# Patient Record
Sex: MALE | Race: ASIAN | NOT HISPANIC OR LATINO | Employment: FULL TIME | URBAN - METROPOLITAN AREA
[De-identification: names, ages, dates, MRNs, and addresses within clinical notes are randomized per-mention and may not be internally consistent; named-entity substitution may affect disease eponyms.]

---

## 2017-03-14 ENCOUNTER — OFFICE VISIT (OUTPATIENT)
Dept: URGENT CARE | Facility: URGENT CARE | Age: 27
End: 2017-03-14
Payer: COMMERCIAL

## 2017-03-14 VITALS
SYSTOLIC BLOOD PRESSURE: 149 MMHG | WEIGHT: 186.8 LBS | BODY MASS INDEX: 26.8 KG/M2 | TEMPERATURE: 98.1 F | DIASTOLIC BLOOD PRESSURE: 85 MMHG | OXYGEN SATURATION: 98 % | HEART RATE: 114 BPM

## 2017-03-14 DIAGNOSIS — B00.1 COLD SORE: Primary | ICD-10-CM

## 2017-03-14 DIAGNOSIS — B00.1 HERPES LABIALIS: ICD-10-CM

## 2017-03-14 PROCEDURE — 99213 OFFICE O/P EST LOW 20 MIN: CPT | Performed by: FAMILY MEDICINE

## 2017-03-14 RX ORDER — ACYCLOVIR 400 MG/1
400 TABLET ORAL 3 TIMES DAILY
Qty: 15 TABLET | Refills: 5 | Status: SHIPPED | OUTPATIENT
Start: 2017-03-14 | End: 2018-05-09

## 2017-03-14 NOTE — NURSING NOTE
"Chief Complaint   Patient presents with     Mouth Lesions     inside of mouth x 1 week and outside for 2 days       Initial /85  Pulse 114  Temp 98.1  F (36.7  C) (Oral)  Wt 186 lb 12.8 oz (84.7 kg)  SpO2 98%  BMI 26.8 kg/m2 Estimated body mass index is 26.8 kg/(m^2) as calculated from the following:    Height as of 7/25/14: 5' 10\" (1.778 m).    Weight as of this encounter: 186 lb 12.8 oz (84.7 kg).  BP completed using cuff size: annetta BURTON CMA ([facility])  4:55 PM 3/14/2017    "

## 2017-03-14 NOTE — PROGRESS NOTES
SUBJECTIVE:                                                    Piedad Medina is a 26 year old male who presents to clinic today for the following health issues:      Canker sores x 2 for 1 week and cold sore x 2 days.     Canker sore x 1 week, getting worse x 3 days.  Larger and more painful.  Tried OTC without relief.    Cold sore developed 1 day ago.  Gets prodrome of itching/tingling.    No assoc URI.    Admits to increase stress.    Had never had Rx for cold sores.      Problem list and histories reviewed & adjusted, as indicated.  Additional history: as documented    Patient Active Problem List   Diagnosis     CARDIOVASCULAR SCREENING; LDL GOAL LESS THAN 160     Past Surgical History   Procedure Laterality Date     Dental surgery       wisdom       Social History   Substance Use Topics     Smoking status: Former Smoker     Smokeless tobacco: Never Used      Comment: does have E-cig     Alcohol use Yes     Family History   Problem Relation Age of Onset     DIABETES Maternal Grandmother      HEART DISEASE Maternal Grandfather      smoking     Cardiovascular Paternal Grandfather      COPD         Current Outpatient Prescriptions   Medication Sig Dispense Refill     acyclovir (ZOVIRAX) 400 MG tablet Take 1 tablet (400 mg) by mouth 3 times daily 15 tablet 5     No Known Allergies    ROS:  Constitutional, HEENT, cardiovascular, pulmonary, gi and gu systems are negative, except as otherwise noted.    OBJECTIVE:                                                    /85  Pulse 114  Temp 98.1  F (36.7  C) (Oral)  Wt 186 lb 12.8 oz (84.7 kg)  SpO2 98%  BMI 26.8 kg/m2  Body mass index is 26.8 kg/(m^2).   GENERAL: healthy, alert and no distress  HENT: ear canals and TM's normal, nose and mouth with one 1cm ulcer on left buccal mucosa. right angle of mouth with classic vesicular eruption.  NECK: bilateral anterior cervical adenopathy, no asymmetry, masses, or scars and thyroid normal to palpation  RESP: lungs clear to  auscultation - no rales, rhonchi or wheezes  CV: regular rate and rhythm, normal S1 S2, no S3 or S4, no murmur, click or rub, no peripheral edema and peripheral pulses strong  ABDOMEN: soft, nontender, no hepatosplenomegaly, no masses and bowel sounds normal  MS: no gross musculoskeletal defects noted, no edema    Diagnostic Test Results:  none      ASSESSMENT:                                                    Canker sore/aphthous ulcer  Herpes labialis     PLAN:                                                        ICD-10-CM    1. Cold sore B00.1 acyclovir (ZOVIRAX) 400 MG tablet         See orders for meds.    Also try OTC cankaid and ibu for pain.  Follow up in 4 days if not improving.      Guy Abdi MD  [facility]

## 2017-03-14 NOTE — MR AVS SNAPSHOT
After Visit Summary   3/14/2017    Piedad Medina    MRN: [id number]           Patient Information     Date Of Birth          1990        Visit Information        Provider Department      3/14/2017 5:05 PM Guy Abdi MD [facility]        Today's Diagnoses     Cold sore    -  1    Herpes labialis           Follow-ups after your visit        Who to contact     If you have questions or need follow up information about today's clinic visit or your schedule please contact [facility] directly at [phone].  Normal or non-critical lab and imaging results will be communicated to you by MarketSharehart, letter or phone within 4 business days after the clinic has received the results. If you do not hear from us within 7 days, please contact the clinic through 8thBridget or phone. If you have a critical or abnormal lab result, we will notify you by phone as soon as possible.  Submit refill requests through [email or web address] or call your pharmacy and they will forward the refill request to us. Please allow 3 business days for your refill to be completed.          Additional Information About Your Visit        MyChart Information     [email or web address] gives you secure access to your electronic health record. If you see a primary care provider, you can also send messages to your care team and make appointments. If you have questions, please call your primary care clinic.  If you do not have a primary care provider, please call [phone] and they will assist you.        Care EveryWhere ID     This is your Care EveryWhere ID. This could be used by other organizations to access your [place] medical records  [id number]        Your Vitals Were     Pulse Temperature Pulse Oximetry BMI (Body Mass Index)          114 98.1  F (36.7  C) (Oral) 98% 26.8 kg/m2         Blood Pressure from Last 3 Encounters:   03/14/17 149/85   07/25/14 141/88   02/20/14 128/80    Weight from Last 3 Encounters:   03/14/17 186  lb 12.8 oz (84.7 kg)   07/25/14 168 lb (76.2 kg)   02/20/14 164 lb (74.4 kg)              Today, you had the following     No orders found for display         Today's Medication Changes          These changes are accurate as of: 3/14/17  5:23 PM.  If you have any questions, ask your nurse or doctor.               Start taking these medicines.        Dose/Directions    acyclovir 400 MG tablet   Commonly known as:  ZOVIRAX   Used for:  Cold sore   Started by:  Guy Abdi MD        Dose:  400 mg   Take 1 tablet (400 mg) by mouth 3 times daily   Quantity:  15 tablet   Refills:  5            Where to get your medicines      These medications were sent to [id number]  [place] - [address] [place] AT [facility] [address] [place] [address]     Phone:  [phone]     acyclovir 400 MG tablet                Primary Care Provider    Referred MD Joel       No address on file        Thank you!     Thank you for choosing [facility]  for your care. Our goal is always to provide you with excellent care. Hearing back from our patients is one way we can continue to improve our services. Please take a few minutes to complete the written survey that you may receive in the mail after your visit with us. Thank you!             Your Updated Medication List - Protect others around you: Learn how to safely use, store and throw away your medicines at www.disposemymeds.org.          This list is accurate as of: 3/14/17  5:23 PM.  Always use your most recent med list.                   Brand Name Dispense Instructions for use    acyclovir 400 MG tablet    ZOVIRAX    15 tablet    Take 1 tablet (400 mg) by mouth 3 times daily

## 2017-04-10 ENCOUNTER — MYC REFILL (OUTPATIENT)
Dept: URGENT CARE | Facility: URGENT CARE | Age: 27
End: 2017-04-10

## 2017-04-10 DIAGNOSIS — B00.1 COLD SORE: ICD-10-CM

## 2017-04-10 RX ORDER — ACYCLOVIR 400 MG/1
400 TABLET ORAL 3 TIMES DAILY
Qty: 15 TABLET | Refills: 5 | Status: CANCELLED | OUTPATIENT
Start: 2017-04-10

## 2017-04-10 NOTE — TELEPHONE ENCOUNTER
Message from MyChart:  Original authorizing provider: Guy Abdi MD    Piedad Medina would like a refill of the following medications:  acyclovir (ZOVIRAX) 400 MG tablet [Guy Abdi MD]    Preferred pharmacy: [facility] [address] [place] [address] [address] AT [facility] [address]    Comment:  I can feel a cold sore coming back. Had the flu and weakened my immune system.

## 2018-04-30 ENCOUNTER — MYC REFILL (OUTPATIENT)
Dept: URGENT CARE | Facility: URGENT CARE | Age: 28
End: 2018-04-30

## 2018-04-30 DIAGNOSIS — B00.1 COLD SORE: ICD-10-CM

## 2018-04-30 RX ORDER — ACYCLOVIR 400 MG/1
400 TABLET ORAL 3 TIMES DAILY
Qty: 15 TABLET | Refills: 5 | Status: CANCELLED | OUTPATIENT
Start: 2018-04-30

## 2018-05-01 NOTE — TELEPHONE ENCOUNTER
Message from StarMobilehart:  Original authorizing provider: Guy Abdi MD    Piedad Medina would like a refill of the following medications:  acyclovir (ZOVIRAX) 400 MG tablet [Guy Abdi MD]    Preferred pharmacy: [facility] [address] [place] [address] [address] AT [facility] [address]    Comment:  I need this medication for my cold sore. [facility] has already tried contacting [facility] but no response yet.

## 2018-05-09 DIAGNOSIS — B00.1 COLD SORE: ICD-10-CM

## 2018-05-09 RX ORDER — ACYCLOVIR 400 MG/1
400 TABLET ORAL 3 TIMES DAILY
Qty: 15 TABLET | Refills: 0 | Status: SHIPPED | OUTPATIENT
Start: 2018-05-09 | End: 2018-11-06

## 2018-05-09 NOTE — LETTER
May 9, 2018    Piedad Medina  [address]   [place] [address]    Dear Piedad,       We recently received a refill request for acyclovir (ZOVIRAX) 400 MG tablet.  We have refilled this for a one time supply only because you are due for a:    Physical office visit and Fasting lab appointment      Please schedule this lab appointment 4-5 days prior to the office visit.     Please call at your earliest convenience so that there will not be a delay with your future refills.          Thank you,   Your [facility] Team/ks  [phone]

## 2018-05-09 NOTE — TELEPHONE ENCOUNTER
Medication is being filled for 1 time refill only due to:  Patient needs to be seen for fasting lab appointment and appointment with the provider for further refills.  Dulce Swartz RN

## 2018-07-18 ENCOUNTER — TELEPHONE (OUTPATIENT)
Dept: FAMILY MEDICINE | Facility: CLINIC | Age: 28
End: 2018-07-18

## 2018-07-18 NOTE — LETTER
[facility]  [address] [facility] [id number] [phone]    July 18, 2018    Piedad Medina  [address]   [place] [facility] [address]      Dear Piedad Medina:    Your health is important to us and we missed you at your recent appointment. Please call us if you need to reschedule your appointment for another date and time.   Any time you are unable to keep your appointment we kindly ask that you call us at least two hours in advance to let us know. This will allow us to offer the time to another patient.  If you did not attend your appointment because of concerns over your ability to pay for care, please contact me so we can discuss payment options.   If you have any questions regarding this notice, please contact me at  [phone].   Sincerely,           [facility] Clinic Administrator/Manager

## 2018-07-18 NOTE — TELEPHONE ENCOUNTER
Per Isaak Thomas's request-no show letter # 1 done for no shows on 7/18/18 and 7/10/18.Susan Dyer MA/SABINE

## 2018-09-12 ENCOUNTER — MYC MEDICAL ADVICE (OUTPATIENT)
Dept: FAMILY MEDICINE | Facility: CLINIC | Age: 28
End: 2018-09-12

## 2018-09-14 NOTE — TELEPHONE ENCOUNTER
LM X 2 09/12/18 and 09/14/18, needs to reschedule appointment. Also sent My Chart message 09/12/18.  No response from patient.  SABINE Hayes

## 2018-09-17 NOTE — TELEPHONE ENCOUNTER
XAVI x3 and sent My Chart message with no response. This appointment has been cancelled. They will need to reschedule.  SABINE Hayes

## 2018-11-06 ENCOUNTER — OFFICE VISIT (OUTPATIENT)
Dept: FAMILY MEDICINE | Facility: CLINIC | Age: 28
End: 2018-11-06
Payer: COMMERCIAL

## 2018-11-06 VITALS
HEART RATE: 75 BPM | TEMPERATURE: 97.5 F | OXYGEN SATURATION: 100 % | WEIGHT: 184.38 LBS | HEIGHT: 69 IN | DIASTOLIC BLOOD PRESSURE: 81 MMHG | BODY MASS INDEX: 27.31 KG/M2 | SYSTOLIC BLOOD PRESSURE: 126 MMHG | RESPIRATION RATE: 16 BRPM

## 2018-11-06 DIAGNOSIS — Z00.00 PREVENTATIVE HEALTH CARE: Primary | ICD-10-CM

## 2018-11-06 DIAGNOSIS — Z23 NEED FOR VACCINATION: ICD-10-CM

## 2018-11-06 DIAGNOSIS — L81.8 TATTOO: ICD-10-CM

## 2018-11-06 DIAGNOSIS — B00.1 COLD SORE: ICD-10-CM

## 2018-11-06 LAB
HBV CORE AB SERPL QL IA: NONREACTIVE
HBV SURFACE AB SERPL IA-ACNC: 18.31 M[IU]/ML
HBV SURFACE AG SERPL QL IA: NONREACTIVE
HCV AB SERPL QL IA: NONREACTIVE
HIV 1+2 AB+HIV1 P24 AG SERPL QL IA: NONREACTIVE

## 2018-11-06 PROCEDURE — 99395 PREV VISIT EST AGE 18-39: CPT | Mod: 25 | Performed by: INTERNAL MEDICINE

## 2018-11-06 PROCEDURE — 86706 HEP B SURFACE ANTIBODY: CPT | Performed by: INTERNAL MEDICINE

## 2018-11-06 PROCEDURE — 90471 IMMUNIZATION ADMIN: CPT | Performed by: INTERNAL MEDICINE

## 2018-11-06 PROCEDURE — 87340 HEPATITIS B SURFACE AG IA: CPT | Performed by: INTERNAL MEDICINE

## 2018-11-06 PROCEDURE — 86704 HEP B CORE ANTIBODY TOTAL: CPT | Performed by: INTERNAL MEDICINE

## 2018-11-06 PROCEDURE — 36415 COLL VENOUS BLD VENIPUNCTURE: CPT | Performed by: INTERNAL MEDICINE

## 2018-11-06 PROCEDURE — 86803 HEPATITIS C AB TEST: CPT | Performed by: INTERNAL MEDICINE

## 2018-11-06 PROCEDURE — 87389 HIV-1 AG W/HIV-1&-2 AB AG IA: CPT | Performed by: INTERNAL MEDICINE

## 2018-11-06 PROCEDURE — 90632 HEPA VACCINE ADULT IM: CPT | Performed by: INTERNAL MEDICINE

## 2018-11-06 RX ORDER — ACYCLOVIR 400 MG/1
400 TABLET ORAL 3 TIMES DAILY
Qty: 15 TABLET | Refills: 3 | Status: SHIPPED | OUTPATIENT
Start: 2018-11-06 | End: 2019-10-22

## 2018-11-06 ASSESSMENT — ENCOUNTER SYMPTOMS
HEADACHES: 1
DIARRHEA: 0
JOINT SWELLING: 0
WEAKNESS: 0
HEMATOCHEZIA: 0
NAUSEA: 0
HEARTBURN: 0
EYE PAIN: 0
CHILLS: 0
MYALGIAS: 0
CONSTIPATION: 0
DYSURIA: 0
NERVOUS/ANXIOUS: 0
SORE THROAT: 0
PARESTHESIAS: 0
PALPITATIONS: 0
COUGH: 0
ARTHRALGIAS: 0
SHORTNESS OF BREATH: 0
FREQUENCY: 0
HEMATURIA: 0
DIZZINESS: 0
FEVER: 0
ABDOMINAL PAIN: 0

## 2018-11-06 ASSESSMENT — PAIN SCALES - GENERAL: PAINLEVEL: NO PAIN (0)

## 2018-11-06 NOTE — MR AVS SNAPSHOT
After Visit Summary   11/6/2018    Piedad Medina    MRN: [id number]           Patient Information     Date Of Birth          1990        Visit Information        Provider Department      11/6/2018 8:00 AM Rafaela Oden MD [facility]        Today's Diagnoses     Preventative health care    -  1    Cold sore        Tattoo        Need for vaccination          Care Instructions      Preventive Health Recommendations  Male Ages 26 - 39    Yearly exam:             See your health care provider every year in order to  o   Review health changes.   o   Discuss preventive care.    o   Review your medicines if your doctor has prescribed any.    You should be tested each year for STDs (sexually transmitted diseases), if you re at risk.     After age 35, talk to your provider about cholesterol testing. If you are at risk for heart disease, have your cholesterol tested at least every 5 years.     If you are at risk for diabetes, you should have a diabetes test (fasting glucose).  Shots: Get a flu shot each year. Get a tetanus shot every 10 years.     Nutrition:    Eat at least 5 servings of fruits and vegetables daily.     Eat whole-grain bread, whole-wheat pasta and brown rice instead of white grains and rice.     Get adequate Calcium and Vitamin D.     Lifestyle    Exercise for at least 150 minutes a week (30 minutes a day, 5 days a week). This will help you control your weight and prevent disease.     Limit alcohol to one drink per day.     No smoking.     Wear sunscreen to prevent skin cancer.     See your dentist every six months for an exam and cleaning.             Follow-ups after your visit        Who to contact     If you have questions or need follow up information about today's clinic visit or your schedule please contact [facility] directly at [phone].  Normal or non-critical lab and imaging results will be communicated to you by MyChart, letter or phone within 4  "business days after the clinic has received the results. If you do not hear from us within 7 days, please contact the clinic through Rapid RMS or phone. If you have a critical or abnormal lab result, we will notify you by phone as soon as possible.  Submit refill requests through Rapid RMS or call your pharmacy and they will forward the refill request to us. Please allow 3 business days for your refill to be completed.          Additional Information About Your Visit        Rapid RMS Information     Rapid RMS gives you secure access to your electronic health record. If you see a primary care provider, you can also send messages to your care team and make appointments. If you have questions, please call your primary care clinic.  If you do not have a primary care provider, please call [phone] and they will assist you.        Care EveryWhere ID     This is your Care EveryWhere ID. This could be used by other organizations to access your [place] medical records  [id number]        Your Vitals Were     Pulse Temperature Respirations Height Pulse Oximetry BMI (Body Mass Index)    75 97.5  F (36.4  C) (Oral) 16 5' 9.49\" (1.765 m) 100% 26.85 kg/m2       Blood Pressure from Last 3 Encounters:   11/06/18 126/81   03/14/17 149/85   07/25/14 141/88    Weight from Last 3 Encounters:   11/06/18 184 lb 6 oz (83.6 kg)   03/14/17 186 lb 12.8 oz (84.7 kg)   07/25/14 168 lb (76.2 kg)              We Performed the Following     1st  Administration  [34811]     HEPA VACCINE ADULT IM     Hepatitis B core antibody [EFS7107]     Hepatitis B Surface Antibody [LCW7375]     Hepatitis B surface antigen [ZHV788]     Hepatitis C antibody [TIE882]     HIV Antigen Antibody Combo [BCV6565]     Lipid panel reflex to direct LDL Fasting     SCREENING QUESTIONS FOR ADULT IMMUNIZATIONS          Where to get your medicines      These medications were sent to [facility] [address] [place] [address] [address] AT [facility] [address] [address] [address] [facility] [address]     Phone:  [phone]     acyclovir 400 MG tablet          Primary Care Provider Office Phone # Fax #    [facility] [phone] [address] [facility] [address]        Equal Access to Services     LUZ CACERES : Hadii aad ku hadasho Soomaali, waaxda luqadaha, qaybta kaalmada adeegyada, waxay idiin hayaan adeeg khdarlingsh layina terry. So [facility] [phone].    ATENCIÓN: Si habla español, tiene a baldwin disposición servicios gratuitos de asistencia lingüística. Llame al [phone].    We comply with applicable federal civil rights laws and [place] laws. We do not discriminate on the basis of race, color, national origin, age, disability, sex, sexual orientation, or gender identity.            Thank you!     Thank you for choosing [facility]  for your care. Our goal is always to provide you with excellent care. Hearing back from our patients is one way we can continue to improve our services. Please take a few minutes to complete the written survey that you may receive in the mail after your visit with us. Thank you!             Your Updated Medication List - Protect others around you: Learn how to safely use, store and throw away your medicines at www.disposemymeds.org.          This list is accurate as of 11/6/18  8:34 AM.  Always use your most recent med list.                   Brand Name Dispense Instructions for use Diagnosis    acyclovir 400 MG tablet    ZOVIRAX    15 tablet    Take 1 tablet (400 mg) by mouth 3 times daily    Cold sore

## 2018-11-06 NOTE — LETTER
[facility]  [address] [facility] [id number] [phone]        November 13, 2019    Piedad Medina  [address]   [place] [address]              Dear Piedad Medina    This is to remind you that your Fasting lab is due.    You may call our office at [phone] to schedule an appointment.    Please disregard this notice if you have already had your labs drawn or made an appointment.        Sincerely,        Rafaela Oden MD

## 2018-11-06 NOTE — PROGRESS NOTES
SUBJECTIVE:   CC: Piedad Medina is an 28 year old male who presents for preventative health visit.     Physical   Annual:     Getting at least 3 servings of Calcium per day:  Yes    Bi-annual eye exam:  Yes    Dental care twice a year:  Yes    Sleep apnea or symptoms of sleep apnea:  Excessive snoring    Diet:  Regular (no restrictions)    Frequency of exercise:  2-3 days/week    Duration of exercise:  30-45 minutes    Taking medications regularly:  Yes    Medication side effects:  None    Additional concerns today:  Yes      Cold sores - needs refill of medication.    Today's PHQ-2 Score:   PHQ-2 ( 1999 Pfizer) 11/6/2018   Q1: Little interest or pleasure in doing things 0   Q2: Feeling down, depressed or hopeless 0   PHQ-2 Score 0   Q1: Little interest or pleasure in doing things Not at all   Q2: Feeling down, depressed or hopeless Not at all   PHQ-2 Score 0       Do you feel safe in your environment - Yes    Social History   Substance Use Topics     Smoking status: Former Smoker     Smokeless tobacco: Never Used      Comment: does have E-cig     Alcohol use Yes         Last PSA: No results found for: PSA    Reviewed orders with patient. Reviewed health maintenance and updated orders accordingly - Yes    Reviewed and updated as needed this visit by clinical staff  Tobacco  Allergies  Meds  Med Hx  Surg Hx  Fam Hx  Soc Hx        Reviewed and updated as needed this visit by Provider        History reviewed. No pertinent past medical history.   Past Surgical History:   Procedure Laterality Date     DENTAL SURGERY      wisdom       Review of Systems   Constitutional: Negative for chills and fever.   HENT: Negative for congestion, ear pain, hearing loss and sore throat.    Eyes: Negative for pain and visual disturbance.   Respiratory: Negative for cough and shortness of breath.    Cardiovascular: Negative for chest pain, palpitations and peripheral edema.   Gastrointestinal: Negative for abdominal pain,  "constipation, diarrhea, heartburn, hematochezia and nausea.   Genitourinary: Negative for discharge, dysuria, frequency, genital sores, hematuria, impotence and urgency.   Musculoskeletal: Negative for arthralgias, joint swelling and myalgias.   Skin: Negative for rash.   Neurological: Positive for headaches. Negative for dizziness, weakness and paresthesias.   Psychiatric/Behavioral: Negative for mood changes. The patient is not nervous/anxious.      CONSTITUTIONAL: NEGATIVE for fever, chills, change in weight  INTEGUMENTARY/SKIN: NEGATIVE for worrisome rashes, moles or lesions except for cold sore on lower lip.  EYES: NEGATIVE for vision changes or irritation  ENT: NEGATIVE for ear, mouth and throat problems  RESP: NEGATIVE for significant cough or SOB  CV: NEGATIVE for chest pain, palpitations or peripheral edema  GI: NEGATIVE for nausea, abdominal pain, heartburn, or change in bowel habits   male: negative for dysuria, hematuria, decreased urinary stream, erectile dysfunction, urethral discharge  MUSCULOSKELETAL: NEGATIVE for significant arthralgias or myalgia  NEURO: NEGATIVE for weakness, dizziness or paresthesias  PSYCHIATRIC: NEGATIVE for changes in mood or affect    OBJECTIVE:   /81  Pulse 75  Temp 97.5  F (36.4  C) (Oral)  Resp 16  Ht 5' 9.49\" (1.765 m)  Wt 184 lb 6 oz (83.6 kg)  SpO2 100%  BMI 26.85 kg/m2    Physical Exam  GENERAL: healthy, alert and no distress  EYES: Eyes grossly normal to inspection, PERRL and conjunctivae and sclerae normal  HENT: ear canals and TM's normal, nose and pharynx without ulcers or lesions.  Right lower lip with 5mm slightly raised lesion, c/w healing cold sore.  NECK: no adenopathy, no asymmetry, masses, or scars and thyroid normal to palpation  RESP: lungs clear to auscultation - no rales, rhonchi or wheezes  CV: regular rate and rhythm, normal S1 S2, no S3 or S4, no murmur, click or rub, no peripheral edema and peripheral pulses strong  ABDOMEN: soft, " "nontender, no hepatosplenomegaly, no masses and bowel sounds normal   (male): normal male genitalia without lesions or urethral discharge, no hernia  MS: no gross musculoskeletal defects noted, no edema  SKIN: no suspicious lesions or rashes.  Tattoos present  NEURO: Normal strength and tone, mentation intact and speech normal  PSYCH: mentation appears normal, affect normal/bright    Diagnostic Test Results:  none     ASSESSMENT/PLAN:       ICD-10-CM    1. Preventative health care Z00.00 HEPA VACCINE ADULT IM     HIV Antigen Antibody Combo [USW3763]     Hepatitis B core antibody [LRJ4699]     Hepatitis B Surface Antibody [DCB9282]     Hepatitis B surface antigen [KTK367]     Hepatitis C antibody [ZAX568]     Lipid panel reflex to direct LDL Fasting     CANCELED: Lipid panel reflex to direct LDL Fasting   2. Cold sore B00.1 acyclovir (ZOVIRAX) 400 MG tablet   3. Tattoo L81.8 HIV Antigen Antibody Combo [XMI4432]     Hepatitis B core antibody [EII0389]     Hepatitis B Surface Antibody [BTL4136]     Hepatitis B surface antigen [HLA719]     Hepatitis C antibody [QTZ534]   4. Need for vaccination Z23 1st  Administration  [49719]     SCREENING QUESTIONS FOR ADULT IMMUNIZATIONS       COUNSELING:   Reviewed preventive health counseling, as reflected in patient instructions    BP Readings from Last 1 Encounters:   11/06/18 126/81     Estimated body mass index is 26.85 kg/(m^2) as calculated from the following:    Height as of this encounter: 5' 9.49\" (1.765 m).    Weight as of this encounter: 184 lb 6 oz (83.6 kg).      Weight management plan: Discussed healthy diet and exercise guidelines and patient will follow up in 12 months in clinic to re-evaluate.     reports that he has quit smoking. He has never used smokeless tobacco.    Refill of his acyclovir given for prn use for cold sores as he has used this previously.  Await lab results and manage as indicated.  Hep A dose #2 given today.   Check hiv and hepatitis tests " given no testing since last tattoo done.      Counseling Resources:  ATP IV Guidelines  Pooled Cohorts Equation Calculator  FRAX Risk Assessment  ICSI Preventive Guidelines  Dietary Guidelines for Americans, 2010  USDA's MyPlate  ASA Prophylaxis  Lung CA Screening    Rafaela Oden MD  [facility] [place]  Answers for HPI/ROS submitted by the patient on 11/6/2018   PHQ-2 Score: 0

## 2019-05-25 ENCOUNTER — TRANSFERRED RECORDS (OUTPATIENT)
Dept: HEALTH INFORMATION MANAGEMENT | Facility: CLINIC | Age: 29
End: 2019-05-25

## 2019-10-16 ENCOUNTER — MYC REFILL (OUTPATIENT)
Dept: FAMILY MEDICINE | Facility: CLINIC | Age: 29
End: 2019-10-16

## 2019-10-16 DIAGNOSIS — B00.1 COLD SORE: ICD-10-CM

## 2019-10-16 RX ORDER — ACYCLOVIR 400 MG/1
400 TABLET ORAL 3 TIMES DAILY
Qty: 15 TABLET | Refills: 3 | OUTPATIENT
Start: 2019-10-16

## 2019-10-16 NOTE — TELEPHONE ENCOUNTER
Requested Prescriptions   Pending Prescriptions Disp Refills     acyclovir (ZOVIRAX) 400 MG tablet 15 tablet 3     Sig: Take 1 tablet (400 mg) by mouth 3 times daily       Antivirals for Herpes Protocol Failed - 10/16/2019  9:22 AM        Failed - Normal serum creatinine on file in past 12 months

## 2019-10-19 ENCOUNTER — MYC REFILL (OUTPATIENT)
Dept: FAMILY MEDICINE | Facility: CLINIC | Age: 29
End: 2019-10-19

## 2019-10-19 DIAGNOSIS — B00.1 COLD SORE: ICD-10-CM

## 2019-10-19 RX ORDER — ACYCLOVIR 400 MG/1
400 TABLET ORAL 3 TIMES DAILY
Qty: 15 TABLET | Refills: 3 | Status: CANCELLED | OUTPATIENT
Start: 2019-10-19

## 2019-10-21 NOTE — TELEPHONE ENCOUNTER
TC, please assist patient with appointment and after its made RN can refill medication.    Faith Song BSN, RN

## 2019-10-22 RX ORDER — ACYCLOVIR 400 MG/1
400 TABLET ORAL 3 TIMES DAILY
Qty: 9 TABLET | Refills: 0 | Status: SHIPPED | OUTPATIENT
Start: 2019-10-22 | End: 2020-03-04

## 2019-12-11 ENCOUNTER — DOCUMENTATION ONLY (OUTPATIENT)
Dept: LAB | Facility: CLINIC | Age: 29
End: 2019-12-11

## 2019-12-11 NOTE — PROGRESS NOTES
On 19, we sent an over due lab letter to this patient and he has not made an appt. The tests are now  and have been canceled. If you would like him to return to the clinic for a lab only appt, please have your team contact him and place new Future orders.    Thank you, Sho Luo MLT

## 2020-03-02 ENCOUNTER — HEALTH MAINTENANCE LETTER (OUTPATIENT)
Age: 30
End: 2020-03-02

## 2020-03-04 ENCOUNTER — OFFICE VISIT (OUTPATIENT)
Dept: FAMILY MEDICINE | Facility: CLINIC | Age: 30
End: 2020-03-04
Payer: COMMERCIAL

## 2020-03-04 VITALS
BODY MASS INDEX: 26.48 KG/M2 | WEIGHT: 185 LBS | OXYGEN SATURATION: 99 % | HEART RATE: 66 BPM | TEMPERATURE: 98 F | HEIGHT: 70 IN | SYSTOLIC BLOOD PRESSURE: 111 MMHG | DIASTOLIC BLOOD PRESSURE: 73 MMHG

## 2020-03-04 DIAGNOSIS — B07.8 COMMON WART: ICD-10-CM

## 2020-03-04 DIAGNOSIS — B00.1 COLD SORE: Primary | ICD-10-CM

## 2020-03-04 PROCEDURE — 17110 DESTRUCTION B9 LES UP TO 14: CPT | Performed by: NURSE PRACTITIONER

## 2020-03-04 PROCEDURE — 99213 OFFICE O/P EST LOW 20 MIN: CPT | Mod: 25 | Performed by: NURSE PRACTITIONER

## 2020-03-04 RX ORDER — ACYCLOVIR 400 MG/1
400 TABLET ORAL 3 TIMES DAILY
Qty: 9 TABLET | Refills: 0 | Status: SHIPPED | OUTPATIENT
Start: 2020-03-04 | End: 2020-03-04

## 2020-03-04 RX ORDER — ACYCLOVIR 400 MG/1
400 TABLET ORAL 3 TIMES DAILY
Qty: 21 TABLET | Refills: 3 | Status: SHIPPED | OUTPATIENT
Start: 2020-03-04 | End: 2021-11-04

## 2020-03-04 ASSESSMENT — PAIN SCALES - GENERAL: PAINLEVEL: NO PAIN (0)

## 2020-03-04 ASSESSMENT — MIFFLIN-ST. JEOR: SCORE: 1805.4

## 2020-03-04 NOTE — PROGRESS NOTES
Subjective     Piedad Medina is a 29 year old male who presents to clinic today for the following health issues:    HPI   Medication Followup of Acyclovir    Taking Medication as prescribed: yes    Side Effects:  None    Medication Helping Symptoms:  yes     Gets 7-8 outbreaks per year that are usually triggered by fluctuation in health. He had a upper respiratory infection last week.    He also has a wart on his thumb that he would like treated. He has had it for about 1.5 years. He's tried OTC therapy without relief.     Patient Active Problem List   Diagnosis     CARDIOVASCULAR SCREENING; LDL GOAL LESS THAN 160     Past Surgical History:   Procedure Laterality Date     DENTAL SURGERY      wisdom       Social History     Tobacco Use     Smoking status: Former Smoker     Smokeless tobacco: Never Used     Tobacco comment: does have E-cig   Substance Use Topics     Alcohol use: Yes     Family History   Problem Relation Age of Onset     Diabetes Maternal Grandmother      Heart Disease Maternal Grandfather         smoking     Cardiovascular Paternal Grandfather         COPD         Current Outpatient Medications   Medication Sig Dispense Refill     acyclovir (ZOVIRAX) 400 MG tablet Take 1 tablet (400 mg) by mouth 3 times daily for 7 days This is enough for 3 courses 21 tablet 3     No Known Allergies  BP Readings from Last 3 Encounters:   03/04/20 111/73   11/06/18 126/81   03/14/17 149/85    Wt Readings from Last 3 Encounters:   03/04/20 83.9 kg (185 lb)   11/06/18 83.6 kg (184 lb 6 oz)   03/14/17 84.7 kg (186 lb 12.8 oz)                      Reviewed and updated as needed this visit by Provider  Tobacco  Allergies  Meds  Problems  Med Hx  Surg Hx  Fam Hx         Review of Systems   ROS COMP: Constitutional, HEENT, cardiovascular, pulmonary, gi and gu systems are negative, except as otherwise noted.      Objective    /73 (BP Location: Right arm, Patient Position: Chair, Cuff Size: Adult Large)   Pulse 66   " Temp 98  F (36.7  C) (Oral)   Ht 1.778 m (5' 10\")   Wt 83.9 kg (185 lb)   SpO2 99%   BMI 26.54 kg/m    Body mass index is 26.54 kg/m .  Physical Exam   GENERAL: healthy, alert and no distress  NECK: no adenopathy, no asymmetry, masses, or scars and thyroid normal to palpation  RESP: lungs clear to auscultation - no rales, rhonchi or wheezes  CV: regular rate and rhythm, normal S1 S2, no S3 or S4, no murmur, click or rub, no peripheral edema and peripheral pulses strong  MS: no gross musculoskeletal defects noted, no edema  SKIN: common wart to right thumb; cluster of vesicular lesions over erythematous base to corner of right mouth  PSYCH: mentation appears normal, affect normal/bright    Diagnostic Test Results:  Labs reviewed in Epic        Consent obtained. 3 freeze/thaw cycles to common wart on right thumb. Covered with bacitracin and a band aid. Patient tolerated well without any immediate complications.    Assessment & Plan     1. Cold sore  Refilled.   - acyclovir (ZOVIRAX) 400 MG tablet; Take 1 tablet (400 mg) by mouth 3 times daily for 7 days This is enough for 3 courses  Dispense: 21 tablet; Refill: 3    2. Common wart  Treated with cryotherapy in clinic today. Discussed home cares. RTC 3-4 weeks if not resolved for repeat cryotherapy.       BMI:   Estimated body mass index is 26.54 kg/m  as calculated from the following:    Height as of this encounter: 1.778 m (5' 10\").    Weight as of this encounter: 83.9 kg (185 lb).           See Patient Instructions    Return in about 4 weeks (around 4/1/2020) for if wart is not gone.     The benefits, risks and potential side effects were discussed in detail. Black box warnings discussed as relevant. All patient questions were answered. The patient was instructed to follow up immediately if any adverse reactions develop.    Return precautions discussed, including when to seek urgent/emergent care.    Patient verbalizes understanding and agrees with plan of " care. Patient stable for discharge.      JAIDEN Garcia [facility]

## 2020-03-04 NOTE — PATIENT INSTRUCTIONS
At [facility], we strive to deliver an exceptional experience to you, every time we see you. If you receive a survey, please complete it as we do value your feedback.  If you have MyChart, you can expect to receive results automatically within 24 hours of their completion.  Your provider will send a note interpreting your results as well.   If you do not have MyChart, you should receive your results in about a week by mail.    Your care team:                            Family Medicine Internal Medicine   MD Jose Alexander MD Shantel Branch-Fleming, MD Katya Georgiev PA-C Megan Hill, APRKVNG Bose, MD Pediatrics   Andres Escamilla, PARajendraC  Kitty Medrano, MD Francoise Paredes APRN CNP   MD Jailene To MD Deborah Mielke, MD Kim Thein, APRN CNP      Clinic hours: Monday - Thursday 7 am-7 pm; Fridays 7 am-5 pm.   Urgent care: Monday - Friday 11 am-9 pm; Saturday and Sunday 9 am-5 pm.  Pharmacy : Monday -Thursday 8 am-8 pm; Friday 8 am-6 pm; Saturday and Sunday 9 am-5 pm.     Clinic: [phone]   Pharmacy: [phone]

## 2020-12-20 ENCOUNTER — HEALTH MAINTENANCE LETTER (OUTPATIENT)
Age: 30
End: 2020-12-20

## 2021-04-24 ENCOUNTER — HEALTH MAINTENANCE LETTER (OUTPATIENT)
Age: 31
End: 2021-04-24

## 2021-08-11 ENCOUNTER — IMMUNIZATION (OUTPATIENT)
Dept: NURSING | Facility: CLINIC | Age: 31
End: 2021-08-11
Payer: COMMERCIAL

## 2021-08-11 PROCEDURE — 0001A PR COVID VAC PFIZER DIL RECON 30 MCG/0.3 ML IM: CPT

## 2021-08-11 PROCEDURE — 91300 PR COVID VAC PFIZER DIL RECON 30 MCG/0.3 ML IM: CPT

## 2021-09-01 ENCOUNTER — IMMUNIZATION (OUTPATIENT)
Dept: NURSING | Facility: CLINIC | Age: 31
End: 2021-09-01
Attending: FAMILY MEDICINE
Payer: COMMERCIAL

## 2021-09-01 PROCEDURE — 91300 PR COVID VAC PFIZER DIL RECON 30 MCG/0.3 ML IM: CPT

## 2021-09-01 PROCEDURE — 0002A PR COVID VAC PFIZER DIL RECON 30 MCG/0.3 ML IM: CPT

## 2021-10-03 ENCOUNTER — HEALTH MAINTENANCE LETTER (OUTPATIENT)
Age: 31
End: 2021-10-03

## 2021-11-04 ENCOUNTER — MYC REFILL (OUTPATIENT)
Dept: FAMILY MEDICINE | Facility: CLINIC | Age: 31
End: 2021-11-04

## 2021-11-04 DIAGNOSIS — B00.1 COLD SORE: ICD-10-CM

## 2021-11-04 RX ORDER — ACYCLOVIR 400 MG/1
400 TABLET ORAL 3 TIMES DAILY
Qty: 21 TABLET | Refills: 3 | Status: CANCELLED | OUTPATIENT
Start: 2021-11-04

## 2021-11-08 NOTE — PATIENT INSTRUCTIONS
At [facility], we strive to deliver an exceptional experience to you, every time we see you. If you receive a survey, please complete it as we do value your feedback.  If you have MyChart, you can expect to receive results automatically within 24 hours of their completion.  Your provider will send a note interpreting your results as well.   If you do not have MyChart, you should receive your results in about a week by mail.    Your care team:                            Family Medicine Internal Medicine   MD Jose Alexander MD Shantel Branch-Fleming, MD Srinivasa Vaka, MD Katya Belousova, PAISAI Phelps, APRN CNP    Misael Bose, MD Pediatrics   Andres Escamilla, PAISAI Medrano, CNP MD Francoise Davidson APRN CNP   MD Jailene To MD Deborah Mielke, MD Katrin Jimenez, APRN [facility]      Clinic hours: Monday - Thursday 7 am-6 pm; Fridays 7 am-5 pm.   Urgent care: Monday - Friday 10 am- 8 pm; Saturday and Sunday 9 am-5 pm.    Clinic: [phone]       [facility]: Monday - Thursday 8 am - 7 pm; Friday 8 am - 6 pm  [facility]: [phone]     Use www.[email or web address].org for 24/7 diagnosis and treatment of dozens of conditions.    Patient Education     Plantar Warts  Warts are common skin growths that can appear anywhere on the body. Warts on the soles of the feet are called plantar warts. These warts are not a serious health problem. They usually go away without treatment. But plantar warts can be painful when you stand or walk. If this is the case, special cushions can help relieve pressure and pain. Drugstores carry these cushions and you can buy them without a prescription. If cushions don't work and the pain interferes with walking, the wart can be removed.  General care    Your healthcare provider may remove the plantar wart:  ? With prescription medicines. These may be placed  directly on the wart at each office visit. Or you may be sent home with the medicine.  ? With a blade, or by freezing (cryotherapy), burning (electrocautery), or laser treatment.    You may be instructed to treat the wart yourself at home using an over-the-counter wart-removal medicine (such as 40% salicylic acid). Apply the medicine to the wart every day as directed. Don't apply the medicine to the healthy skin around the wart. In between applications, remove the dead wart tissue using the type of file suggested by your healthcare provider. You will likely need to repeat this process for several weeks to remove the entire wart.    Warts can spread from your foot to other parts of your body and to other people. Don't scratch or pick at the wart. Wash your hands well before and after touching your warts. If your child has warts, be certain to teach him or her proper hand washing techniques as well.    While many home remedies are suggested for warts, it's best to check with your healthcare provider before trying them.    Warts often come back, even after successful treatment. Return promptly for treatment of any new warts.  Follow-up care  Follow up with your healthcare provider, or as advised.  When to seek medical advice    Signs of infection (red streaks, pus, smelly or colored discharge, or fever) appear    You have heavy bleeding or bleeding that won t stop with light pressure    The wart doesn t go away after several weeks of self-care    New warts appear on feet, hands, or face  C[employer]ar last reviewed this educational content on 5/1/2018 2000-2021 [employer]. All rights reserved. This information is not intended as a substitute for professional medical care. Always follow your healthcare professional's instructions.

## 2021-11-08 NOTE — PROGRESS NOTES
"  Assessment & Plan   Problem List Items Addressed This Visit     None      Visit Diagnoses     Plantar warts    -  Primary    Relevant Medications    salicylic acid (MEDIPLAST) 40 % miscellaneous    Other Relevant Orders    DESTRUCT BENIGN LESION, UP TO 14       Verbal consent received.  Wart(s) treated in typical fashion with liquid nitrogen. Patient tolerated well      6 minutes spent on the date of the encounter doing chart review, history and exam, documentation and further activities per the note        Return in about 4 weeks (around 12/7/2021), or if symptoms worsen or fail to improve.    FROYLAN Paige  [facility] BIMAL Stewart is a 31 year old who presents for the following health issues   HPI     Two warts for 2-3 months, tried some over the counter and home remedies w/o benefit       Review of Systems   SKIN- warts as above      Objective    /73   Pulse 76   Temp 98.2  F (36.8  C) (Tympanic)   Resp 18   Ht 1.778 m (5' 10\")   Wt 78.9 kg (174 lb)   SpO2 100%   BMI 24.97 kg/m    Body mass index is 24.97 kg/m .  Physical Exam     Rt foot b/w first and second toes, on 8 mm thick wart and and adjacent 4 mm much shallower wart              "

## 2021-11-09 ENCOUNTER — OFFICE VISIT (OUTPATIENT)
Dept: FAMILY MEDICINE | Facility: CLINIC | Age: 31
End: 2021-11-09
Payer: COMMERCIAL

## 2021-11-09 VITALS
OXYGEN SATURATION: 100 % | HEIGHT: 70 IN | BODY MASS INDEX: 24.91 KG/M2 | DIASTOLIC BLOOD PRESSURE: 73 MMHG | TEMPERATURE: 98.2 F | SYSTOLIC BLOOD PRESSURE: 112 MMHG | HEART RATE: 76 BPM | RESPIRATION RATE: 18 BRPM | WEIGHT: 174 LBS

## 2021-11-09 DIAGNOSIS — B07.0 PLANTAR WARTS: Primary | ICD-10-CM

## 2021-11-09 PROCEDURE — 17110 DESTRUCTION B9 LES UP TO 14: CPT | Performed by: PHYSICIAN ASSISTANT

## 2021-11-09 ASSESSMENT — PAIN SCALES - GENERAL: PAINLEVEL: SEVERE PAIN (6)

## 2021-11-09 ASSESSMENT — MIFFLIN-ST. JEOR: SCORE: 1745.51

## 2021-11-23 ENCOUNTER — MYC REFILL (OUTPATIENT)
Dept: FAMILY MEDICINE | Facility: CLINIC | Age: 31
End: 2021-11-23
Payer: COMMERCIAL

## 2021-11-23 DIAGNOSIS — B00.1 COLD SORE: ICD-10-CM

## 2021-11-23 RX ORDER — ACYCLOVIR 400 MG/1
400 TABLET ORAL 3 TIMES DAILY
Qty: 21 TABLET | Refills: 0 | Status: SHIPPED | OUTPATIENT
Start: 2021-11-23 | End: 2022-03-02

## 2021-11-23 NOTE — TELEPHONE ENCOUNTER
Routing refill request to provider for review/approval because:  Labs not current:  Creatinine    Faith Song BSN, RN

## 2021-11-23 NOTE — TELEPHONE ENCOUNTER
I have not seen patient since March 2020. Routing to provider who last saw patient. Please see patient's message in refill request.

## 2022-02-27 DIAGNOSIS — B00.1 COLD SORE: ICD-10-CM

## 2022-03-01 NOTE — TELEPHONE ENCOUNTER
Routing refill request to provider for review/approval because:        Normal serum creatinine on file in past 12 months    No lab results found.    Luz Elena Payne RN, BSN   [facility] [place]

## 2022-03-02 RX ORDER — ACYCLOVIR 400 MG/1
400 TABLET ORAL 3 TIMES DAILY
Qty: 21 TABLET | Refills: 0 | Status: SHIPPED | OUTPATIENT
Start: 2022-03-02 | End: 2022-09-07

## 2022-05-15 ENCOUNTER — HEALTH MAINTENANCE LETTER (OUTPATIENT)
Age: 32
End: 2022-05-15

## 2022-09-07 ENCOUNTER — OFFICE VISIT (OUTPATIENT)
Dept: FAMILY MEDICINE | Facility: CLINIC | Age: 32
End: 2022-09-07
Payer: COMMERCIAL

## 2022-09-07 VITALS
TEMPERATURE: 99 F | BODY MASS INDEX: 25.96 KG/M2 | DIASTOLIC BLOOD PRESSURE: 84 MMHG | WEIGHT: 175.25 LBS | OXYGEN SATURATION: 99 % | SYSTOLIC BLOOD PRESSURE: 137 MMHG | RESPIRATION RATE: 16 BRPM | HEIGHT: 69 IN | HEART RATE: 79 BPM

## 2022-09-07 DIAGNOSIS — Z00.00 ROUTINE GENERAL MEDICAL EXAMINATION AT A HEALTH CARE FACILITY: Primary | ICD-10-CM

## 2022-09-07 DIAGNOSIS — Z13.1 ENCOUNTER FOR SCREENING EXAMINATION FOR IMPAIRED GLUCOSE REGULATION AND DIABETES MELLITUS: ICD-10-CM

## 2022-09-07 DIAGNOSIS — M25.511 CHRONIC RIGHT SHOULDER PAIN: ICD-10-CM

## 2022-09-07 DIAGNOSIS — Z13.6 CARDIOVASCULAR SCREENING; LDL GOAL LESS THAN 160: ICD-10-CM

## 2022-09-07 DIAGNOSIS — G89.29 CHRONIC RIGHT SHOULDER PAIN: ICD-10-CM

## 2022-09-07 DIAGNOSIS — B00.1 COLD SORE: ICD-10-CM

## 2022-09-07 PROCEDURE — 99213 OFFICE O/P EST LOW 20 MIN: CPT | Mod: 25 | Performed by: NURSE PRACTITIONER

## 2022-09-07 PROCEDURE — 99395 PREV VISIT EST AGE 18-39: CPT | Performed by: NURSE PRACTITIONER

## 2022-09-07 RX ORDER — ACYCLOVIR 400 MG/1
400 TABLET ORAL 3 TIMES DAILY
Qty: 21 TABLET | Refills: 4 | Status: SHIPPED | OUTPATIENT
Start: 2022-09-07 | End: 2023-10-03

## 2022-09-07 ASSESSMENT — ENCOUNTER SYMPTOMS
CHILLS: 0
PARESTHESIAS: 0
FREQUENCY: 0
DIZZINESS: 0
DYSURIA: 0
WEAKNESS: 0
ABDOMINAL PAIN: 0
HEMATURIA: 0
SORE THROAT: 0
NAUSEA: 0
ARTHRALGIAS: 0
HEARTBURN: 0
EYE PAIN: 0
NERVOUS/ANXIOUS: 0
PALPITATIONS: 1
MYALGIAS: 0
FEVER: 0
SHORTNESS OF BREATH: 0
HEADACHES: 0
CONSTIPATION: 0
JOINT SWELLING: 0
HEMATOCHEZIA: 0
DIARRHEA: 0
COUGH: 0

## 2022-09-07 ASSESSMENT — PAIN SCALES - GENERAL: PAINLEVEL: NO PAIN (0)

## 2022-09-07 NOTE — PROGRESS NOTES
SUBJECTIVE:   CC: Piedad Medina is an 32 year old male who presents for preventative health visit.       Patient has been advised of split billing requirements and indicates understanding: Yes  Healthy Habits:     Getting at least 3 servings of Calcium per day:  Yes    Bi-annual eye exam:  Yes    Dental care twice a year:  Yes    Sleep apnea or symptoms of sleep apnea:  Excessive snoring    Diet:  Regular (no restrictions)    Frequency of exercise:  1 day/week    Duration of exercise:  15-30 minutes    Taking medications regularly:  Yes    Medication side effects:  None    PHQ-2 Total Score: 0    Additional concerns today:  Yes          Cold sore  Gets 0-4 times per year    Right shoulder pain x1 year  Did physical therapy which was helping and has continued to do exercises  Can feel it in shoulder when weather changes  Sharp pain that shoots down right up and up into neck  On worsttday still needing to use sling for comfort  On best day no pain    Pressure in left groin  Lasts about 1 week  Occurs every other month x6 months  Occurred randomly  Not currently present  He will follow up if becomes issue again    Today's PHQ-2 Score:   PHQ-2 ( 1999 [employer]) 9/7/2022   Q1: Little interest or pleasure in doing things 0   Q2: Feeling down, depressed or hopeless 0   PHQ-2 Score 0   PHQ-2 Total Score (12-17 Years)- Positive if 3 or more points; Administer PHQ-A if positive -   Q1: Little interest or pleasure in doing things Not at all   Q2: Feeling down, depressed or hopeless Not at all   PHQ-2 Score 0       Abuse: Current or Past(Physical, Sexual or Emotional)- No  Do you feel safe in your environment? Yes    Have you ever done Advance Care Planning? (For example, a Health Directive, POLST, or a discussion with a medical provider or your loved ones about your wishes): No, advance care planning information given to patient to review.  Patient plans to discuss their wishes with loved ones or provider.      Social History      Tobacco Use     Smoking status: Former Smoker     Packs/day: 0.50     Years: 1.00     Pack years: 0.50     Types: Cigarettes, Cigars     Start date: 2012     Quit date: 2012     Years since quittin.6     Smokeless tobacco: Never Used     Tobacco comment: does have E-cig   Substance Use Topics     Alcohol use: Yes         Alcohol Use 2022   Prescreen: >3 drinks/day or >7 drinks/week? No       Last PSA: No results found for: PSA    Reviewed orders with patient. Reviewed health maintenance and updated orders accordingly - Yes  Lab work is in process  Labs reviewed in EPIC  BP Readings from Last 3 Encounters:   22 137/84   21 112/73   20 111/73    Wt Readings from Last 3 Encounters:   22 79.5 kg (175 lb 4 oz)   21 78.9 kg (174 lb)   20 83.9 kg (185 lb)                  Patient Active Problem List   Diagnosis     CARDIOVASCULAR SCREENING; LDL GOAL LESS THAN 160     Past Surgical History:   Procedure Laterality Date     DENTAL SURGERY      wisdom       Social History     Tobacco Use     Smoking status: Former Smoker     Packs/day: 0.50     Years: 1.00     Pack years: 0.50     Types: Cigarettes, Cigars     Start date: 2012     Quit date: 2012     Years since quittin.6     Smokeless tobacco: Never Used     Tobacco comment: does have E-cig   Substance Use Topics     Alcohol use: Yes     Family History   Problem Relation Age of Onset     Diabetes Maternal Grandmother         Type 2     Heart Disease Maternal Grandfather         smoking     Cardiovascular Paternal Grandfather         COPD         Current Outpatient Medications   Medication Sig Dispense Refill     acyclovir (ZOVIRAX) 400 MG tablet Take 1 tablet (400 mg) by mouth 3 times daily For cold sores 21 tablet 4     salicylic acid (MEDIPLAST) 40 % miscellaneous Apply at bedtime for 4-6 weeks (Patient not taking: Reported on 2022) 30 each 1     No Known Allergies  No lab results found.  "    Reviewed and updated as needed this visit by clinical staff   Tobacco  Allergies  Meds  Problems  Med Hx  Surg Hx  Fam Hx            Reviewed and updated as needed this visit by Provider   Tobacco  Allergies  Meds  Problems  Med Hx  Surg Hx  Fam Hx           History reviewed. No pertinent past medical history.   Past Surgical History:   Procedure Laterality Date     DENTAL SURGERY      wisdom       Review of Systems   Constitutional: Negative for chills and fever.   HENT: Negative for congestion, ear pain, hearing loss and sore throat.    Eyes: Negative for pain and visual disturbance.   Respiratory: Negative for cough and shortness of breath.    Cardiovascular: Positive for palpitations. Negative for chest pain and peripheral edema.   Gastrointestinal: Negative for abdominal pain, constipation, diarrhea, heartburn, hematochezia and nausea.   Genitourinary: Positive for genital sores. Negative for dysuria, frequency, hematuria, impotence, penile discharge and urgency.   Musculoskeletal: Negative for arthralgias, joint swelling and myalgias.   Skin: Negative for rash.   Neurological: Negative for dizziness, weakness, headaches and paresthesias.   Psychiatric/Behavioral: Negative for mood changes. The patient is not nervous/anxious.          OBJECTIVE:   /84 (BP Location: Left arm, Patient Position: Sitting, Cuff Size: Adult Regular)   Pulse 79   Temp 99  F (37.2  C) (Tympanic)   Resp 16   Ht 1.748 m (5' 8.82\")   Wt 79.5 kg (175 lb 4 oz)   SpO2 99%   BMI 26.02 kg/m      Physical Exam  GENERAL: healthy, alert and no distress  EYES: Eyes grossly normal to inspection, PERRL and conjunctivae and sclerae normal  HENT: ear canals and TM's normal, nose and mouth without ulcers or lesions  NECK: no adenopathy, no asymmetry, masses, or scars and thyroid normal to palpation  RESP: lungs clear to auscultation - no rales, rhonchi or wheezes  CV: regular rate and rhythm, normal S1 S2, no S3 or S4, no " "murmur, click or rub, no peripheral edema and peripheral pulses strong  ABDOMEN: soft, nontender, no hepatosplenomegaly, no masses and bowel sounds normal  MS: no gross musculoskeletal defects noted, no edema  SKIN: no suspicious lesions or rashes  NEURO: Normal strength and tone, mentation intact and speech normal  PSYCH: mentation appears normal, affect normal/bright    Diagnostic Test Results:  Labs reviewed in Epic  No results found for this or any previous visit (from the past 24 hour(s)).    ASSESSMENT/PLAN:   (Z00.00) Routine general medical examination at a health care facility  (primary encounter diagnosis)    (B00.1) Cold sore  Comment: Not currently present. Refilled to have on hand.  Plan: acyclovir (ZOVIRAX) 400 MG tablet            (M25.511,  G89.29) Chronic right shoulder pain  Comment: ongoing. Will get XR and refer to ortho. He will do x-ray when he does labs next week.   Plan: XR Shoulder Right G/E 3 Views, Orthopedic          Referral            (Z13.6) CARDIOVASCULAR SCREENING; LDL GOAL LESS THAN 160  Comment: he will do fasting labs next week  Plan: Lipid panel reflex to direct LDL Fasting            (Z13.1) Encounter for screening examination for impaired glucose regulation and diabetes mellitus  Comment: he will do fasting labs next week  Plan: Glucose                  COUNSELING:   Reviewed preventive health counseling, as reflected in patient instructions       Regular exercise       Healthy diet/nutrition    Estimated body mass index is 26.02 kg/m  as calculated from the following:    Height as of this encounter: 1.748 m (5' 8.82\").    Weight as of this encounter: 79.5 kg (175 lb 4 oz).         He reports that he quit smoking about 9 years ago. His smoking use included cigarettes and cigars. He started smoking about 10 years ago. He has a 0.50 pack-year smoking history. He has never used smokeless tobacco.    The benefits, risks and potential side effects were discussed in detail. " Black box warnings discussed as relevant. All patient questions were answered. The patient was instructed to follow up immediately if any adverse reactions develop.    Return precautions discussed, including when to seek urgent/emergent care.    Patient verbalizes understanding and agrees with plan of care. Patient stable for discharge.      Counseling Resources:  ATP IV Guidelines  Pooled Cohorts Equation Calculator  FRAX Risk Assessment  ICSI Preventive Guidelines  Dietary Guidelines for Americans, 2010  USDA's MyPlate  ASA Prophylaxis  Lung CA Screening    JAIDEN LAWRENCE [facility]

## 2022-09-10 ENCOUNTER — HEALTH MAINTENANCE LETTER (OUTPATIENT)
Age: 32
End: 2022-09-10

## 2022-09-14 ENCOUNTER — LAB (OUTPATIENT)
Dept: LAB | Facility: CLINIC | Age: 32
End: 2022-09-14
Payer: COMMERCIAL

## 2022-09-14 ENCOUNTER — ANCILLARY PROCEDURE (OUTPATIENT)
Dept: GENERAL RADIOLOGY | Facility: CLINIC | Age: 32
End: 2022-09-14
Attending: NURSE PRACTITIONER
Payer: COMMERCIAL

## 2022-09-14 DIAGNOSIS — Z13.6 CARDIOVASCULAR SCREENING; LDL GOAL LESS THAN 160: ICD-10-CM

## 2022-09-14 DIAGNOSIS — M25.511 CHRONIC RIGHT SHOULDER PAIN: ICD-10-CM

## 2022-09-14 DIAGNOSIS — G89.29 CHRONIC RIGHT SHOULDER PAIN: ICD-10-CM

## 2022-09-14 DIAGNOSIS — Z13.1 ENCOUNTER FOR SCREENING EXAMINATION FOR IMPAIRED GLUCOSE REGULATION AND DIABETES MELLITUS: ICD-10-CM

## 2022-09-14 LAB
CHOLEST SERPL-MCNC: 172 MG/DL
FASTING STATUS PATIENT QL REPORTED: YES
FASTING STATUS PATIENT QL REPORTED: YES
GLUCOSE BLD-MCNC: 105 MG/DL (ref 70–99)
HDLC SERPL-MCNC: 66 MG/DL
LDLC SERPL CALC-MCNC: 89 MG/DL
NONHDLC SERPL-MCNC: 106 MG/DL
TRIGL SERPL-MCNC: 85 MG/DL

## 2022-09-14 PROCEDURE — 80061 LIPID PANEL: CPT

## 2022-09-14 PROCEDURE — 82947 ASSAY GLUCOSE BLOOD QUANT: CPT

## 2022-09-14 PROCEDURE — 73030 X-RAY EXAM OF SHOULDER: CPT | Mod: TC | Performed by: RADIOLOGY

## 2022-09-14 PROCEDURE — 36415 COLL VENOUS BLD VENIPUNCTURE: CPT

## 2023-02-01 NOTE — PROGRESS NOTES
Piedad Medina  :  1990  DOS: 2/3/2023  MRN: [id number]  PCP: [facility] - [facility]    Sports Medicine Clinic Visit      HPI  Piedad Medina is a 32 year old male who is seen in consultation at the request of  Gely Giraldo C.N.P. presenting with right shoulder pain.     Notes trampoline injury at age 11 with subluxation incident.  Over the years he has had apprehension and occasional subluxation events, never truly dislocated or had to have the shoulder reduced.  Symptoms of been very manageable up until now.      He reports that he is now having high amounts of pain over the last 2 years. Uses a sling for pain management and ibuprofen. Daily subluxation incidents. Discomfort/pain is over the anterior right shoulder along with posterior right shoulder with radiation into his posterior right neck occasionally.  ROM in shoulder flexion is limited due to feeling of instability. Is interested in discussing treatment options.  He has done physical therapy in the past, which was helpful.    He endorses some mild numbness/tingling in the ulnar nerve distribution distal to the elbow when he rests his elbows on armrests or prolonged elbow flexion activities.  Symptoms are intermittent and go away with arm repositioning.  Denies radicular shooting pain or weakness.    Social History:  supervisor for [employer]    Review of Systems  Musculoskeletal: as above  Remainder of review of systems is negative including constitutional, CV, pulmonary, GI, Skin and Neurologic except as noted in HPI or medical history.    No past medical history on file.  Past Surgical History:   Procedure Laterality Date     DENTAL SURGERY      wisdom     Family History   Problem Relation Age of Onset     Diabetes Maternal Grandmother         Type 2     Heart Disease Maternal Grandfather         smoking     Cardiovascular Paternal Grandfather         COPD       Objective  /76   Wt 79.5 kg (175 lb 4 oz)   BMI 26.02  kg/m        General: healthy, alert and in no acute distress      HEENT: no scleral icterus or conjunctival erythema     Skin: no suspicious lesions or rash. No jaundice.     CV: regular rhythm by palpation, 2+ distal pulses, no pedal edema      Resp: normal respiratory effort without conversational dyspnea     Psych: normal mood and affect      Gait: nonantalgic, appropriate coordination and balance     Neuro:        - Sensation to light touch:  Intact throughout the BUE including all peripheral nerve distributions.        - MSR:  2+ in bilateral biceps, triceps, brachioradialis.        - Special tests:   - Spurling's:  Neg bilaterally    MSK - Shoulder:       - Inspection:    - No significant swelling, asymmetry, erythema, warmth, ecchymosis, lesion, or atrophy noted.        - ROM:    -Full AROM/PROM of bilateral shoulders with some feelings of apprehension on the right during shoulder abduction and external rotation.       - Palpation:    - TTP at the anterior and posterior fossa of the glenohumeral joint  - NTTP elsewhere.        - Strength:    - 5/5 in BUE including Sab, SF, SIR, SER, EF, EE, Pron, Sup, WE, WF, FDI, ADM, FPL, APB, EPL.        - Special tests:        - Cordova:  Neg   - Neers:  Neg   - Empty can:  Neg for pain and weakness   - Virden: Neg   - Scarf:  Neg   - Speeds:  Neg   - Yergason:  Neg   - Apprehension/Relocation:  +Pos on the right    Radiology  I independently reviewed the available relevant imaging, with the following interpretation:   - XR R shoulder 9/14/2022 shows normal anatomic alignment, no significant degenerative changes, no fractures or dislocations.      Assessment  1. Shoulder instability, right    2. Tear of right glenoid labrum, initial encounter        Plan  Piedad Medina is a 32 year old male that presents with right shoulder pain and feelings of subluxation. History and physical exam appear most consistent with chronic anterior shoulder instability.      Also briefly  discussed some transient neuropathies of the ulnar nerve at the elbow which are being caused by prolonged elbow flexion and resting on armrests.  Educated on avoidance maneuvers and elbow extension splinting at night to decompress the nerve.  Also given nerve glide exercises.      Discussed the nature of the conditions and treatment options and mutually agreed upon the following plan:    - Imaging:         - Reviewed relevant imaging in the chart. Results above. Reviewed imaging with patient.        - Based on history and physical exam, it will be important to evaluate the integrity of the soft tissues of the shoulder joint which will be accomplished with an MR angiogram.  MRA order has been placed and phone number given to patient for scheduling.  - Medications:         - Discussed pharmacologic options for pain relief. May use NSAIDs as needed for pain control. May also use topical creams such as IcyHot, BioFreeze, or Voltaren gel PRN.   - Therapy:         -Discussed the benefits of physical therapy and/or home exercise program.  We will likely place referral for physical therapy, and we will formalize this plan after results of the MRA are obtained.  - Modalities:         - May use ice, heat, massage or other modalities PRN.   - Bracing:         - No shoulder sling or bracing indicated at this time, could consider in the future as needed.  - Surgery:         - Discussed the possibility of surgical interventions based on the results of the MRA.  Will consider appropriate procedures as indicated.  - Activity:         - Encouraged to remain active and participate in regular activities as symptoms allow, avoid exacerbating maneuvers.  - Follow up:         - When results of the MRA are known, to discuss results, reevaluate, and update treatment plan. Patient has clinic contact information for questions or concerns.       Rustam Judge DO  [facility] - Sports Medicine  [facility] -  Department of Orthopedic Surgery

## 2023-02-01 NOTE — TELEPHONE ENCOUNTER
DIAGNOSIS: Chronic right shoulder pain    APPOINTMENT DATE: 02/03/2023   NOTES STATUS DETAILS   OFFICE NOTE from referring provider Internal 09/07/2022 Dr Giraldo [facility]    OFFICE NOTE from other specialist N/A 02/20/2014 Dr Thomas [facility]    DISCHARGE SUMMARY from hospital N/A    DISCHARGE REPORT from the ER N/A    OPERATIVE REPORT N/A    EMG report N/A    MEDICATION LIST N/A    MRI N/A    DEXA (osteoporosis/bone health) N/A    CT SCAN N/A    XRAYS (IMAGES & REPORTS) Internal 09/14/2022 RT shoulder  02/20/2014 RT shoulder

## 2023-02-03 ENCOUNTER — PRE VISIT (OUTPATIENT)
Dept: ORTHOPEDICS | Facility: CLINIC | Age: 33
End: 2023-02-03

## 2023-02-03 ENCOUNTER — OFFICE VISIT (OUTPATIENT)
Dept: ORTHOPEDICS | Facility: CLINIC | Age: 33
End: 2023-02-03
Attending: NURSE PRACTITIONER
Payer: COMMERCIAL

## 2023-02-03 ENCOUNTER — TELEPHONE (OUTPATIENT)
Dept: ORTHOPEDICS | Facility: CLINIC | Age: 33
End: 2023-02-03

## 2023-02-03 VITALS
BODY MASS INDEX: 26.02 KG/M2 | DIASTOLIC BLOOD PRESSURE: 76 MMHG | WEIGHT: 175.25 LBS | SYSTOLIC BLOOD PRESSURE: 119 MMHG

## 2023-02-03 DIAGNOSIS — M25.311 SHOULDER INSTABILITY, RIGHT: Primary | ICD-10-CM

## 2023-02-03 DIAGNOSIS — S43.431A TEAR OF RIGHT GLENOID LABRUM, INITIAL ENCOUNTER: ICD-10-CM

## 2023-02-03 PROCEDURE — 99204 OFFICE O/P NEW MOD 45 MIN: CPT | Performed by: STUDENT IN AN ORGANIZED HEALTH CARE EDUCATION/TRAINING PROGRAM

## 2023-02-03 ASSESSMENT — PAIN SCALES - GENERAL: PAINLEVEL: SEVERE PAIN (6)

## 2023-02-03 NOTE — LETTER
2/3/2023         RE: Piedad Medina  [address]  [place] [address]        Dear Colleague,    Thank you for referring your patient, Piedad Medina, to the [facility] [place]. Please see a copy of my visit note below.    Piedad Medina  :  1990  DOS: 2/3/2023  MRN: [id number]  PCP: Rod - [facility]    Sports Medicine Clinic Visit      HPI  Piedad Medina is a 32 year old male who is seen in consultation at the request of  Gely Giraldo C.N.P. presenting with right shoulder pain.     Notes trampoline injury at age 11 with subluxation incident.  Over the years he has had apprehension and occasional subluxation events, never truly dislocated or had to have the shoulder reduced.  Symptoms of been very manageable up until now.      He reports that he is now having high amounts of pain over the last 2 years. Uses a sling for pain management and ibuprofen. Daily subluxation incidents. Discomfort/pain is over the anterior right shoulder along with posterior right shoulder with radiation into his posterior right neck occasionally.  ROM in shoulder flexion is limited due to feeling of instability. Is interested in discussing treatment options.  He has done physical therapy in the past, which was helpful.    He endorses some mild numbness/tingling in the ulnar nerve distribution distal to the elbow when he rests his elbows on armrests or prolonged elbow flexion activities.  Symptoms are intermittent and go away with arm repositioning.  Denies radicular shooting pain or weakness.    Social History:  supervisor for [employer]    Review of Systems  Musculoskeletal: as above  Remainder of review of systems is negative including constitutional, CV, pulmonary, GI, Skin and Neurologic except as noted in HPI or medical history.    No past medical history on file.  Past Surgical History:   Procedure Laterality Date     DENTAL SURGERY      [facility]  History   Problem Relation Age of Onset     Diabetes Maternal Grandmother         Type 2     Heart Disease Maternal Grandfather         smoking     Cardiovascular Paternal Grandfather         COPD       Objective  /76   Wt 79.5 kg (175 lb 4 oz)   BMI 26.02 kg/m        General: healthy, alert and in no acute distress      HEENT: no scleral icterus or conjunctival erythema     Skin: no suspicious lesions or rash. No jaundice.     CV: regular rhythm by palpation, 2+ distal pulses, no pedal edema      Resp: normal respiratory effort without conversational dyspnea     Psych: normal mood and affect      Gait: nonantalgic, appropriate coordination and balance     Neuro:        - Sensation to light touch:  Intact throughout the BUE including all peripheral nerve distributions.        - MSR:  2+ in bilateral biceps, triceps, brachioradialis.        - Special tests:   - Spurling's:  Neg bilaterally    MSK - Shoulder:       - Inspection:    - No significant swelling, asymmetry, erythema, warmth, ecchymosis, lesion, or atrophy noted.        - ROM:    -Full AROM/PROM of bilateral shoulders with some feelings of apprehension on the right during shoulder abduction and external rotation.       - Palpation:    - TTP at the anterior and posterior fossa of the glenohumeral joint  - NTTP elsewhere.        - Strength:    - 5/5 in BUE including Sab, SF, SIR, SER, EF, EE, Pron, Sup, WE, WF, FDI, ADM, FPL, APB, EPL.        - Special tests:        - Cordova:  Neg   - Neers:  Neg   - Empty can:  Neg for pain and weakness   - Ramsey: Neg   - Scarf:  Neg   - Speeds:  Neg   - Yergason:  Neg   - Apprehension/Relocation:  +Pos on the right    Radiology  I independently reviewed the available relevant imaging, with the following interpretation:   - XR R shoulder 9/14/2022 shows normal anatomic alignment, no significant degenerative changes, no fractures or dislocations.      Assessment  1. Shoulder instability, right    2. Tear of right  glenoid labrum, initial encounter        Plan  Piedad Medina is a 32 year old male that presents with right shoulder pain and feelings of subluxation. History and physical exam appear most consistent with chronic anterior shoulder instability.      Also briefly discussed some transient neuropathies of the ulnar nerve at the elbow which are being caused by prolonged elbow flexion and resting on armrests.  Educated on avoidance maneuvers and elbow extension splinting at night to decompress the nerve.  Also given nerve glide exercises.      Discussed the nature of the conditions and treatment options and mutually agreed upon the following plan:    - Imaging:         - Reviewed relevant imaging in the chart. Results above. Reviewed imaging with patient.        - Based on history and physical exam, it will be important to evaluate the integrity of the soft tissues of the shoulder joint which will be accomplished with an MR angiogram.  MRA order has been placed and phone number given to patient for scheduling.  - Medications:         - Discussed pharmacologic options for pain relief. May use NSAIDs as needed for pain control. May also use topical creams such as IcyHot, BioFreeze, or Voltaren gel PRN.   - Therapy:         -Discussed the benefits of physical therapy and/or home exercise program.  We will likely place referral for physical therapy, and we will formalize this plan after results of the MRA are obtained.  - Modalities:         - May use ice, heat, massage or other modalities PRN.   - Bracing:         - No shoulder sling or bracing indicated at this time, could consider in the future as needed.  - Surgery:         - Discussed the possibility of surgical interventions based on the results of the MRA.  Will consider appropriate procedures as indicated.  - Activity:         - Encouraged to remain active and participate in regular activities as symptoms allow, avoid exacerbating maneuvers.  - Follow up:         - When  results of the MRA are known, to discuss results, reevaluate, and update treatment plan. Patient has clinic contact information for questions or concerns.       Rustam Judge DO  [facility] - Sports Medicine  [facility] - Department of Orthopedic Surgery         Again, thank you for allowing me to participate in the care of your patient.        Sincerely,        Rustam Judge DO

## 2023-02-03 NOTE — PATIENT INSTRUCTIONS
Barby Medina ,     You were seen today for your right shoulder pain and feelings of subluxation secondary to chronic anterior shoulder instability.  As discussed in clinic, our treatment plan will focus on improving the stability of the shoulder with physical therapy, but there may be an additional injury that could possibly require surgery to fix, so will be important to evaluate the integrity of the soft tissues of the shoulder with advanced imaging (MRA).  Please follow the instructions below for scheduling your MRA.  Also discussed transient ulnar neuropathies causing numbness and tingling in the little finger.  You can prevent this in the future by decreasing elbow pressure on armrests and prolonged elbow flexion.  If it is bothering him at night, you can wrap the elbow in a towel to prevent prolonged elbow flexion during the night.  You may use non-steroidal anti-inflammatory (NSAID) medications as needed for pain relief.   You may try topical medications such as IcyHot, BioFreeze, Bengay, or Voltaren gel as well, which may help your symptoms.   Discussed the benefits of Physical Therapy or Home Exercise Program for flexibility, strengthening, and stabilization.  Physical therapy referral will likely be placed, but we will wait till results of the MRA are known.  Providing the number here for scheduling when indicated: [phone].   Follow up when results of the MRA are known to discuss results, reevaluate and update treatment plan.    Please call the clinic for any questions or concerns.    It was a pleasure seeing you today. Thank you for choosing [facility] for your care.     Sincerely,   Rustam Judge, DO  [facility] - Sports Medicine  [facility] - Department of Orthopedic Surgery     -------------------------------  Advanced imaging is done by appointment. Please call [facility] ([facility]/[place]) [phone]  to schedule your  MRA.     Some insurance companies may require a prior authorization to be completed which can delay the time until you are able to schedule your appointment.       If you are active on [employer], you may have access to your test results before your provider is able to review the study and advise on next steps.      The clinic will call you with results. If you have not heard from the clinic within 2-3 days following your MRA, please contact us at the number listed below.

## 2023-03-21 ENCOUNTER — ANCILLARY PROCEDURE (OUTPATIENT)
Dept: MRI IMAGING | Facility: CLINIC | Age: 33
End: 2023-03-21
Attending: STUDENT IN AN ORGANIZED HEALTH CARE EDUCATION/TRAINING PROGRAM
Payer: COMMERCIAL

## 2023-03-21 ENCOUNTER — ANCILLARY PROCEDURE (OUTPATIENT)
Dept: GENERAL RADIOLOGY | Facility: CLINIC | Age: 33
End: 2023-03-21
Attending: STUDENT IN AN ORGANIZED HEALTH CARE EDUCATION/TRAINING PROGRAM
Payer: COMMERCIAL

## 2023-03-21 DIAGNOSIS — M25.311 SHOULDER INSTABILITY, RIGHT: ICD-10-CM

## 2023-03-21 DIAGNOSIS — S43.431A TEAR OF RIGHT GLENOID LABRUM, INITIAL ENCOUNTER: ICD-10-CM

## 2023-03-21 PROCEDURE — 77002 NEEDLE LOCALIZATION BY XRAY: CPT | Performed by: RADIOLOGY

## 2023-03-21 PROCEDURE — A9585 GADOBUTROL INJECTION: HCPCS | Performed by: RADIOLOGY

## 2023-03-21 PROCEDURE — 73222 MRI JOINT UPR EXTREM W/DYE: CPT | Mod: RT | Performed by: RADIOLOGY

## 2023-03-21 PROCEDURE — 23350 INJECTION FOR SHOULDER X-RAY: CPT | Mod: RT | Performed by: RADIOLOGY

## 2023-03-21 RX ORDER — IOPAMIDOL 408 MG/ML
2 INJECTION, SOLUTION INTRATHECAL ONCE
Status: COMPLETED | OUTPATIENT
Start: 2023-03-21 | End: 2023-03-21

## 2023-03-21 RX ORDER — GADOBUTROL 604.72 MG/ML
0.1 INJECTION INTRAVENOUS ONCE
Status: COMPLETED | OUTPATIENT
Start: 2023-03-21 | End: 2023-03-21

## 2023-03-21 RX ADMIN — GADOBUTROL 7.5 ML: 604.72 INJECTION INTRAVENOUS at 14:58

## 2023-03-21 RX ADMIN — IOPAMIDOL 2 ML: 408 INJECTION, SOLUTION INTRATHECAL at 14:59

## 2023-03-21 NOTE — PROGRESS NOTES
Radiology Discharge Instructions Post Lumbar Puncture  AFTER YOU GO HOME  ? DO relax and take it easy for 24 hours; we suggest bed rest until the next morning, except for getting up to the bathroom  ? You may resume normal activity tomorrow  ? You may remove the bandage on your back in the evening or next morning  ? You may resume bathing the next day  ? Drink at least 4 glasses of extra fluid today if not on a fluid restriction.  ? DO NOT drive or operate machinery at home or at work for at least 24 hours.    CALL YOUR PRIMARY PHYSICIAN IF:  ? If you do start to leak a large amount of fluid from the puncture site, lie down flat on your back. Call your primary physician they will tell you if you need to return to the hospital.  ? You develop severe headache  ? You develop nausea or vomiting.  ? You develop a temperature of 101 degrees F or greater.    ADDITIONAL INSTRUCTIONS:   ? If you are taking a blood thinner, you may resume your medication at your regular dose today.  Follow up with your physician to have your INR rechecked if indicated.  ? You may use over the counter pain reliever, do not use aspirin for 24 hours.    Contacts:  During business hours from 8 to 5 pm, you may call [phone] to reach a nurse advisor.  After hours call [facility] [phone].  Ask for the Radiologist on call.  Someone is on call 24 hrs/day.  [facility] Toll Free Number   [phone]

## 2023-03-21 NOTE — PROGRESS NOTES
Terry was seen in X-ray today for a right shoulder arthrogram injection. Patient rated pain before procedure 6/10. After procedure patient rated pain 2/10.   This pain level is acceptable to patient. Patient discharged home.

## 2023-03-27 ENCOUNTER — TELEPHONE (OUTPATIENT)
Dept: ORTHOPEDICS | Facility: CLINIC | Age: 33
End: 2023-03-27
Payer: COMMERCIAL

## 2023-03-27 DIAGNOSIS — M67.911 TENDINOPATHY OF RIGHT ROTATOR CUFF: ICD-10-CM

## 2023-03-27 DIAGNOSIS — S43.431A GLENOID LABRAL TEAR, RIGHT, INITIAL ENCOUNTER: Primary | ICD-10-CM

## 2023-03-27 NOTE — TELEPHONE ENCOUNTER
Called patient and discussed findings of his recent MRI and next steps in the treatment plan.    MR arthrogram showed the presence of mild posterior inferior labral fraying and a recess near the anchor of the biceps tendon with an overall intact tendon attachment and associated tiny paralabral cyst.  Also showed some low-grade tendinopathy of the supraspinatus tendon without an acute tear, and mild subacromial/subdeltoid bursitis.    Discussed the above findings in the setting of chronic shoulder instability without distinct dislocation events requiring manual reduction.  Discussed treatment options that include corticosteroid injections, physical therapy, oral and topical anti-inflammatory medications (ibuprofen, Tylenol, Voltaren gel), and possible surgical intervention in the future if symptoms persist despite nonoperative management.      At this time he is comfortable with working on physical therapy and monitoring symptoms.  We will consider steroid injections in the future as needed.  Physical therapy referral has been placed and phone number ([phone]) was given to patient for scheduling.    Encouraged to call the clinic or message for further questions or concerns, all questions were answered.    Rustam Judge DO, NIALL  [facility] - Sports Medicine  [facility] - Department of Orthopedic Surgery

## 2023-04-27 ENCOUNTER — THERAPY VISIT (OUTPATIENT)
Dept: PHYSICAL THERAPY | Facility: CLINIC | Age: 33
End: 2023-04-27
Payer: COMMERCIAL

## 2023-04-27 DIAGNOSIS — M25.511 CHRONIC RIGHT SHOULDER PAIN: ICD-10-CM

## 2023-04-27 DIAGNOSIS — G89.29 CHRONIC RIGHT SHOULDER PAIN: ICD-10-CM

## 2023-04-27 PROCEDURE — 97110 THERAPEUTIC EXERCISES: CPT | Mod: GP

## 2023-04-27 PROCEDURE — 97161 PT EVAL LOW COMPLEX 20 MIN: CPT | Mod: GP

## 2023-04-27 NOTE — PROGRESS NOTES
Physical Therapy Initial Evaluation  Subjective:  The history is provided by the patient. No  was used.   Patient Health History  Piedad Medina being seen for Physical Therapy.       Problem occurred: Trauma to Right Shoulder when I was 11 or 12   Pain is reported as 4/10 on pain scale.  General health as reported by patient is fair.  Pertinent medical history includes: none.     Medical allergies: none.   Surgeries include:  Orthopedic surgery. Other surgery history details: Ailey teeth.    Current medications:  None.    Current occupation is .   Primary job tasks include:  Computer work.                  Therapist Generated HPI Evaluation  Problem details: He reports that his right shoulder has been bothering him.  When he was 11 or 12 he landed on his right shoulder from a trampoline injury resulting in it locking up, subluxing, and reducing based off of what he was doing.  He reports since then he has been able to sublux his shoulder himself and reduce it himself.  Later on in life he started getting sharper pains in the shoulder, upper trap and in the neck. He also reports that now in the last two years he will have instances where he can't lift his arm up.  With his elbow extended there are times he can't even elevate his shoulder beyond 90 degrees due to right shoulder pain..         Type of problem:  Right shoulder.      Condition occurred with:  A fall.    Patient reports pain:  In the joint.  Pain is described as aching, sharp and shooting   Pain radiates to:  Cervical.   Since onset symptoms are gradually worsening.  Associated symptoms:  Dislocating/subluxing and loss of motion/stiffness. Symptoms are exacerbated by using arm at shoulder level, using arm behind back and using arm overhead    Special tests included:  MRI (No labral tear. Slight inferior fraying.).    Restrictions due to condition include:  Working in normal job without restrictions.  Barriers include:   None as reported by patient.                        Objective:  System                   Shoulder Evaluation:  ROM:  AROM:    Flexion:  Left:  160    Right:  155 (80 without cheating)  Extension: Left: 65Right: 65  Abduction:  Left: 165   Right:  160      External Rotation:  Left:  78    Right:  75          Flexion/External Rotation:  Left:  T4    Right:  T3  Extension/Internal Rotation:  Left:  T7    Right:  T11          Strength:    Flexion: Right: 4+/5  Strong/painful     Pain:   Extension:  Right: 5/5    Pain:  Abduction:  Right: 5/5     Pain:    Internal Rotation:  Right: 5/5     Pain:  External Rotation:   Right:5/5     Pain:            Stability Testing:  Stability testing shoulder: Hypermobility with inferior gliding of the sulcus sign.    Right shoulder stability positive testing:Apprehension and Relocation  Right shoulder stability negative testing:  Sulcus sign  Special Tests:      Right shoulder positive for the following special tests:Impingement  Right shoulder negative for the following special tests:Labral; Bursal and Rotator cuff tear  Palpation:  normal      Mobility Tests:    Glenohumeral anterior right:  Hypermobile  Glenohumeral posterior right:  Hypermobile  Glenohumeral inferior right:  Hypermobile          Scapulohumeral rhythm right:  Normal                                   General     ROS    Assessment/Plan:    Patient is a 32 year old male with right side shoulder complaints.    Patient has the following significant findings with corresponding treatment plan.                Diagnosis 1:  Right Shoulder Pain  Pain -  hot/cold therapy, manual therapy, splint/taping/bracing/orthotics, education, directional preference exercise and home program  Decreased ROM/flexibility - manual therapy, therapeutic exercise, therapeutic activity and home program  Decreased strength - therapeutic exercise, therapeutic activities and home program  Instability -  Therapeutic Activity  Therapeutic  Exercise  Neuromuscular Re-education  Splinting/Taping/Bracing/Orthotic  home program    Therapy Evaluation Codes:   1) History comprised of:   Personal factors that impact the plan of care:      None.    Comorbidity factors that impact the plan of care are:      None.     Medications impacting care: None.  2) Examination of Body Systems comprised of:   Body structures and functions that impact the plan of care:      Shoulder.   Activity limitations that impact the plan of care are:      Lifting and Working.  3) Clinical presentation characteristics are:   Stable/Uncomplicated.  4) Decision-Making    Low complexity using standardized patient assessment instrument and/or measureable assessment of functional outcome.  Cumulative Therapy Evaluation is: Low complexity.    Previous and current functional limitations:  (See Goal Flow Sheet for this information)    Short term and Long term goals: (See Goal Flow Sheet for this information)     Communication ability:  Patient appears to be able to clearly communicate and understand verbal and written communication and follow directions correctly.  Treatment Explanation - The following has been discussed with the patient:   RX ordered/plan of care  Anticipated outcomes  Possible risks and side effects  This patient would benefit from PT intervention to resume normal activities.   Rehab potential is good.    Frequency:  1-2 X month, once daily  Duration:  for 8 weeks  Discharge Plan:  Achieve all LTG.  Independent in home treatment program.  Reach maximal therapeutic benefit.    Please refer to the daily flowsheet for treatment today, total treatment time and time spent performing 1:1 timed codes.

## 2023-10-03 DIAGNOSIS — B00.1 COLD SORE: ICD-10-CM

## 2023-10-03 ASSESSMENT — ENCOUNTER SYMPTOMS
DIZZINESS: 0
SORE THROAT: 0
NAUSEA: 0
HEADACHES: 0
SHORTNESS OF BREATH: 0
CONSTIPATION: 0
NERVOUS/ANXIOUS: 0
MYALGIAS: 0
CHILLS: 0
FREQUENCY: 0
HEMATURIA: 0
PARESTHESIAS: 0
FEVER: 0
ARTHRALGIAS: 0
WEAKNESS: 0
EYE PAIN: 0
ABDOMINAL PAIN: 0
DIARRHEA: 0
COUGH: 0
HEMATOCHEZIA: 0
DYSURIA: 0
JOINT SWELLING: 0
HEARTBURN: 0
PALPITATIONS: 0

## 2023-10-04 RX ORDER — ACYCLOVIR 400 MG/1
400 TABLET ORAL 3 TIMES DAILY
Qty: 21 TABLET | Refills: 0 | Status: SHIPPED | OUTPATIENT
Start: 2023-10-04 | End: 2023-10-10

## 2023-10-10 ENCOUNTER — OFFICE VISIT (OUTPATIENT)
Dept: FAMILY MEDICINE | Facility: CLINIC | Age: 33
End: 2023-10-10
Payer: COMMERCIAL

## 2023-10-10 VITALS
HEIGHT: 69 IN | HEART RATE: 77 BPM | DIASTOLIC BLOOD PRESSURE: 77 MMHG | WEIGHT: 166 LBS | TEMPERATURE: 98.5 F | BODY MASS INDEX: 24.59 KG/M2 | RESPIRATION RATE: 16 BRPM | OXYGEN SATURATION: 98 % | SYSTOLIC BLOOD PRESSURE: 117 MMHG

## 2023-10-10 DIAGNOSIS — B00.1 COLD SORE: ICD-10-CM

## 2023-10-10 DIAGNOSIS — R06.83 SNORING: ICD-10-CM

## 2023-10-10 DIAGNOSIS — R40.0 DAYTIME SOMNOLENCE: ICD-10-CM

## 2023-10-10 DIAGNOSIS — Z13.21 ENCOUNTER FOR VITAMIN DEFICIENCY SCREENING: ICD-10-CM

## 2023-10-10 DIAGNOSIS — Z13.0 SCREENING FOR DEFICIENCY ANEMIA: ICD-10-CM

## 2023-10-10 DIAGNOSIS — Z00.00 ROUTINE GENERAL MEDICAL EXAMINATION AT A HEALTH CARE FACILITY: Primary | ICD-10-CM

## 2023-10-10 DIAGNOSIS — R63.4 WEIGHT LOSS: ICD-10-CM

## 2023-10-10 DIAGNOSIS — R23.8 PAPULE: ICD-10-CM

## 2023-10-10 DIAGNOSIS — Z13.220 SCREENING FOR HYPERLIPIDEMIA: ICD-10-CM

## 2023-10-10 DIAGNOSIS — Z13.1 SCREENING FOR DIABETES MELLITUS: ICD-10-CM

## 2023-10-10 LAB
ALBUMIN SERPL BCG-MCNC: 4.8 G/DL (ref 3.5–5.2)
ALP SERPL-CCNC: 67 U/L (ref 40–129)
ALT SERPL W P-5'-P-CCNC: 69 U/L (ref 0–70)
ANION GAP SERPL CALCULATED.3IONS-SCNC: 10 MMOL/L (ref 7–15)
AST SERPL W P-5'-P-CCNC: 38 U/L (ref 0–45)
BASO+EOS+MONOS # BLD AUTO: NORMAL 10*3/UL
BASO+EOS+MONOS NFR BLD AUTO: NORMAL %
BASOPHILS # BLD AUTO: NORMAL 10*3/UL
BASOPHILS # BLD MANUAL: 0 10E3/UL (ref 0–0.2)
BASOPHILS NFR BLD AUTO: NORMAL %
BASOPHILS NFR BLD MANUAL: 0 %
BILIRUB SERPL-MCNC: 1.1 MG/DL
BUN SERPL-MCNC: 12.1 MG/DL (ref 6–20)
CALCIUM SERPL-MCNC: 9.7 MG/DL (ref 8.6–10)
CHLORIDE SERPL-SCNC: 103 MMOL/L (ref 98–107)
CHOLEST SERPL-MCNC: 183 MG/DL
CREAT SERPL-MCNC: 0.82 MG/DL (ref 0.67–1.17)
DEPRECATED HCO3 PLAS-SCNC: 27 MMOL/L (ref 22–29)
EGFRCR SERPLBLD CKD-EPI 2021: >90 ML/MIN/1.73M2
EOSINOPHIL # BLD AUTO: NORMAL 10*3/UL
EOSINOPHIL # BLD MANUAL: 0.3 10E3/UL (ref 0–0.7)
EOSINOPHIL NFR BLD AUTO: NORMAL %
EOSINOPHIL NFR BLD MANUAL: 5 %
ERYTHROCYTE [DISTWIDTH] IN BLOOD BY AUTOMATED COUNT: 12.3 % (ref 10–15)
GLUCOSE SERPL-MCNC: 107 MG/DL (ref 70–99)
HCT VFR BLD AUTO: 48.8 % (ref 40–53)
HDLC SERPL-MCNC: 64 MG/DL
HGB BLD-MCNC: 16.9 G/DL (ref 13.3–17.7)
IMM GRANULOCYTES # BLD: NORMAL 10*3/UL
IMM GRANULOCYTES NFR BLD: NORMAL %
LDLC SERPL CALC-MCNC: 110 MG/DL
LYMPHOCYTES # BLD AUTO: NORMAL 10*3/UL
LYMPHOCYTES # BLD MANUAL: 2.6 10E3/UL (ref 0.8–5.3)
LYMPHOCYTES NFR BLD AUTO: NORMAL %
LYMPHOCYTES NFR BLD MANUAL: 51 %
MCH RBC QN AUTO: 29.1 PG (ref 26.5–33)
MCHC RBC AUTO-ENTMCNC: 34.6 G/DL (ref 31.5–36.5)
MCV RBC AUTO: 84 FL (ref 78–100)
MONOCYTES # BLD AUTO: NORMAL 10*3/UL
MONOCYTES # BLD MANUAL: 0.2 10E3/UL (ref 0–1.3)
MONOCYTES NFR BLD AUTO: NORMAL %
MONOCYTES NFR BLD MANUAL: 3 %
NEUTROPHILS # BLD AUTO: NORMAL 10*3/UL
NEUTROPHILS # BLD MANUAL: 2.1 10E3/UL (ref 1.6–8.3)
NEUTROPHILS NFR BLD AUTO: NORMAL %
NEUTROPHILS NFR BLD MANUAL: 41 %
NONHDLC SERPL-MCNC: 119 MG/DL
NRBC # BLD AUTO: 0 10E3/UL
NRBC BLD AUTO-RTO: 0 /100
PLAT MORPH BLD: NORMAL
PLATELET # BLD AUTO: 326 10E3/UL (ref 150–450)
POTASSIUM SERPL-SCNC: 4.7 MMOL/L (ref 3.4–5.3)
PROT SERPL-MCNC: 8.1 G/DL (ref 6.4–8.3)
RBC # BLD AUTO: 5.8 10E6/UL (ref 4.4–5.9)
RBC MORPH BLD: NORMAL
SODIUM SERPL-SCNC: 140 MMOL/L (ref 135–145)
TRIGL SERPL-MCNC: 44 MG/DL
TSH SERPL DL<=0.005 MIU/L-ACNC: 2.27 UIU/ML (ref 0.3–4.2)
VIT D+METAB SERPL-MCNC: 21 NG/ML (ref 20–50)
WBC # BLD AUTO: 5.1 10E3/UL (ref 4–11)

## 2023-10-10 PROCEDURE — 85027 COMPLETE CBC AUTOMATED: CPT | Performed by: PHYSICIAN ASSISTANT

## 2023-10-10 PROCEDURE — 99395 PREV VISIT EST AGE 18-39: CPT | Performed by: PHYSICIAN ASSISTANT

## 2023-10-10 PROCEDURE — 80061 LIPID PANEL: CPT | Performed by: PHYSICIAN ASSISTANT

## 2023-10-10 PROCEDURE — 85007 BL SMEAR W/DIFF WBC COUNT: CPT | Performed by: PHYSICIAN ASSISTANT

## 2023-10-10 PROCEDURE — 82306 VITAMIN D 25 HYDROXY: CPT | Performed by: PHYSICIAN ASSISTANT

## 2023-10-10 PROCEDURE — 84443 ASSAY THYROID STIM HORMONE: CPT | Performed by: PHYSICIAN ASSISTANT

## 2023-10-10 PROCEDURE — 36415 COLL VENOUS BLD VENIPUNCTURE: CPT | Performed by: PHYSICIAN ASSISTANT

## 2023-10-10 PROCEDURE — 80053 COMPREHEN METABOLIC PANEL: CPT | Performed by: PHYSICIAN ASSISTANT

## 2023-10-10 PROCEDURE — 99214 OFFICE O/P EST MOD 30 MIN: CPT | Mod: 25 | Performed by: PHYSICIAN ASSISTANT

## 2023-10-10 RX ORDER — ACYCLOVIR 400 MG/1
400 TABLET ORAL 3 TIMES DAILY
Qty: 21 TABLET | Refills: 3 | Status: SHIPPED | OUTPATIENT
Start: 2023-10-10

## 2023-10-10 ASSESSMENT — ENCOUNTER SYMPTOMS
CONSTIPATION: 0
EYE PAIN: 0
PARESTHESIAS: 0
DIARRHEA: 0
HEMATURIA: 0
NERVOUS/ANXIOUS: 0
MYALGIAS: 0
FREQUENCY: 0
HEMATOCHEZIA: 0
SORE THROAT: 0
ABDOMINAL PAIN: 0
CHILLS: 0
DYSURIA: 0
HEADACHES: 0
JOINT SWELLING: 0
DIZZINESS: 0
HEARTBURN: 0
PALPITATIONS: 0
FEVER: 0
NAUSEA: 0
COUGH: 0
ARTHRALGIAS: 0
WEAKNESS: 0
SHORTNESS OF BREATH: 0

## 2023-10-10 ASSESSMENT — PAIN SCALES - GENERAL: PAINLEVEL: NO PAIN (0)

## 2023-10-10 NOTE — PATIENT INSTRUCTIONS
I will notify you of lab results via [employer]  Follow up with sleep medicine  Follow up with Dr. Lucia for vasectomy consult- if can't find an appointment call and we will find an appointment for you.  Follow up with dermatology   Patient Education  Preventive Health Recommendations  Male Ages 26 - 39    Yearly exam:             See your health care provider every year in order to  o   Review health changes.   o   Discuss preventive care.    o   Review your medicines if your doctor has prescribed any.  You should be tested each year for STDs (sexually transmitted diseases), if you re at risk.   After age 35, talk to your provider about cholesterol testing. If you are at risk for heart disease, have your cholesterol tested at least every 5 years.   If you are at risk for diabetes, you should have a diabetes test (fasting glucose).  Shots: Get a flu shot each year. Get a tetanus shot every 10 years.     Nutrition:  Eat at least 5 servings of fruits and vegetables daily.   Eat whole-grain bread, whole-wheat pasta and brown rice instead of white grains and rice.   Get adequate Calcium and Vitamin D.     Lifestyle  Exercise for at least 150 minutes a week (30 minutes a day, 5 days a week). This will help you control your weight and prevent disease.   Limit alcohol to one drink per day.   No smoking.   Wear sunscreen to prevent skin cancer.   See your dentist every six months for an exam and cleaning.

## 2023-10-10 NOTE — PROGRESS NOTES
SUBJECTIVE:   CC: Terry is an 33 year old who presents for preventative health visit.       10/10/2023     6:56 AM   Additional Questions   Roomed by Vickie RODGERS   Accompanied by Self         10/10/2023     6:56 AM   Patient Reported Additional Medications   Patient reports taking the following new medications None       Healthy Habits:     Getting at least 3 servings of Calcium per day:  Yes    Bi-annual eye exam:  Yes    Dental care twice a year:  NO    Sleep apnea or symptoms of sleep apnea:  Daytime drowsiness and Excessive snoring    Diet:  Regular (no restrictions)    Frequency of exercise:  1 day/week    Duration of exercise:  30-45 minutes    Taking medications regularly:  Yes    Medication side effects:  None    Additional concerns today:  No      Today's PHQ-2 Score:       10/10/2023     6:50 AM   PHQ-2 ( 1999 [employer])   Q1: Little interest or pleasure in doing things 1   Q2: Feeling down, depressed or hopeless 0   PHQ-2 Score 1   Q1: Little interest or pleasure in doing things Several days   Q2: Feeling down, depressed or hopeless Not at all   PHQ-2 Score 1                     Social History     Tobacco Use    Smoking status: Former     Packs/day: 0.50     Years: 1.00     Pack years: 0.50     Types: Cigarettes, Cigars     Start date: 1/1/2012     Quit date: 12/31/2012     Years since quitting: 10.7     Passive exposure: Never    Smokeless tobacco: Never    Tobacco comments:     does have E-cig   Substance Use Topics    Alcohol use: Yes             10/3/2023     9:23 AM   Alcohol Use   Prescreen: >3 drinks/day or >7 drinks/week? No       Last PSA: No results found for: PSA    Reviewed orders with patient. Reviewed health maintenance and updated orders accordingly - Yes  BP Readings from Last 3 Encounters:   10/10/23 117/77   02/03/23 119/76   09/07/22 137/84    Wt Readings from Last 3 Encounters:   10/10/23 75.3 kg (166 lb)   02/03/23 79.5 kg (175 lb 4 oz)   09/07/22 79.5 kg (175 lb 4 oz)                   Patient Active Problem List   Diagnosis    CARDIOVASCULAR SCREENING; LDL GOAL LESS THAN 160    Chronic right shoulder pain     Past Surgical History:   Procedure Laterality Date    DENTAL SURGERY      wisdom       Social History     Tobacco Use    Smoking status: Former     Packs/day: 0.50     Years: 1.00     Pack years: 0.50     Types: Cigarettes, Cigars     Start date: 1/1/2012     Quit date: 12/31/2012     Years since quitting: 10.7     Passive exposure: Never    Smokeless tobacco: Former    Tobacco comments:     does have E-cig   Substance Use Topics    Alcohol use: Yes     Family History   Problem Relation Age of Onset    No Known Problems Mother     No Known Problems Father     Lupus Sister     No Known Problems Brother     No Known Problems Brother     No Known Problems Brother     Diabetes Maternal Grandmother         Type 2    Heart Disease Maternal Grandfather         smoking    Cardiovascular Paternal Grandfather         COPD         Current Outpatient Medications   Medication Sig Dispense Refill    acyclovir (ZOVIRAX) 400 MG tablet Take 1 tablet (400 mg) by mouth 3 times daily For cold sores 21 tablet 3       Reviewed and updated as needed this visit by clinical staff   Tobacco  Allergies  Meds              Reviewed and updated as needed this visit by Provider   Tobacco   Meds   Med Hx  Surg Hx  Fam Hx  Soc Hx         Wt Readings from Last 4 Encounters:   10/10/23 75.3 kg (166 lb)   02/03/23 79.5 kg (175 lb 4 oz)   09/07/22 79.5 kg (175 lb 4 oz)   11/09/21 78.9 kg (174 lb)   Patient new to me presents for annual exam. Has had unintentional weight loss Past 3 months - reports weight will fluctuate approximately 4-5 lb-no change in diet or exercise   Few moments here and there anxiety from work but no chest pain , shortness of breath, palpitations.  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 Doesn't believe she has noted apneic episodes but Always tired   Done having kids- interested in vasectomy  Papule left buttock for approximately 3 months- painful if sits on that spot. No fever, sweats, chils     Review of Systems   Constitutional:  Negative for chills and fever.   HENT:  Negative for congestion, ear pain, hearing loss and sore throat.    Eyes:  Negative for pain and visual disturbance.   Respiratory:  Negative for cough and shortness of breath.    Cardiovascular:  Negative for chest pain, palpitations and peripheral edema.   Gastrointestinal:  Negative for abdominal pain, constipation, diarrhea, heartburn, hematochezia and nausea.   Genitourinary:  Negative for dysuria, frequency, genital sores, hematuria, impotence, penile discharge and urgency.   Musculoskeletal:  Negative for arthralgias, joint swelling and myalgias.   Skin:  Negative for rash.   Neurological:  Negative for dizziness, weakness, headaches and paresthesias.   Psychiatric/Behavioral:  Negative for mood changes. The patient is not nervous/anxious.          OBJECTIVE:   /77 (BP Location: Right arm, Patient Position: Sitting, Cuff Size: Adult Regular)   Pulse 77   Temp 98.5  F (36.9  C) (Oral)   Resp 16   Ht 1.753 m (5' 9\")   Wt 75.3 kg (166 lb)   SpO2 98%   BMI 24.51 kg/m      Physical Exam  GENERAL: healthy, alert and no distress  EYES: Eyes grossly normal to inspection, PERRL and conjunctivae and sclerae normal  HENT: ear canals and TM's normal, nose and mouth without ulcers or lesions  NECK: no adenopathy, no asymmetry, masses, or scars and thyroid normal to palpation  RESP: lungs clear to auscultation - no rales, rhonchi or wheezes  CV: regular rate and rhythm, normal S1 S2, no S3 or S4, no murmur, click or rub, no peripheral edema and peripheral pulses strong  ABDOMEN: soft, nontender, no hepatosplenomegaly, no masses and bowel sounds normal  MS: no gross musculoskeletal defects noted, no edema  SKIN: left " buttock with nontender papule with some darker pigmentation.  No fluctuance or pustule. No erythema or warmth   NEURO: Normal strength and tone, mentation intact and speech normal  PSYCH: mentation appears normal, affect normal/bright  LYMPH: no cervical, supraclavicular, axillary, or inguinal adenopathy    Diagnostic Test Results:  none     ASSESSMENT/PLAN:   (Z00.00) Routine general medical examination at a health care facility  (primary encounter diagnosis)  Comment: benign exam except papule   Plan: declines covid and infuenza vaccines     (R63.4) Weight loss  Comment: rule out thyroid disease. Normal exam.  in monogamous relationship.  No constipation or diarrhea. No heartburn  Plan: TSH with free T4 reflex            (R06.83) Snoring  Comment: referred to sleep specialist   Plan:     (R40.0) Daytime somnolence  Comment: rule out sleep apneal referred to sleep specialist  Plan: Adult Sleep Eval & Management          Referral            (B00.1) Cold sore  Comment: refilld acyclovir as needed   Plan: acyclovir (ZOVIRAX) 400 MG tablet, Adult Sleep         Eval & Management  Referral            (R23.8) Papule  Comment: darker pigmented- present for 3 months- referred to derm for excision   Plan: Adult Dermatology Referral            (Z13.21) Encounter for vitamin deficiency screening  Comment: Plan: Vitamin D Deficiency            (Z13.0) Screening for deficiency anemia  Comment: Plan: CBC with Platelets & Differential            (Z13.220) Screening for hyperlipidemia  Comment:   Plan: Lipid panel reflex to direct LDL Fasting            (Z13.1) Screening for diabetes mellitus  Comment:   Plan: Comprehensive metabolic panel            Patient has been advised of split billing requirements and indicates understanding: Yes      COUNSELING:   Reviewed preventive health counseling, as reflected in patient instructions       Regular exercise       Healthy diet/nutrition        Immunizations  Declined: Covid-19 and Influenza due to Concerns about side effects/safety             Family planning        He reports that he quit smoking about 10 years ago. His smoking use included cigarettes and cigars. He started smoking about 11 years ago. He has a 0.50 pack-year smoking history. He has never been exposed to tobacco smoke. He has never used smokeless tobacco.            Lou Ferraro PA-C  [facility]

## 2023-10-11 DIAGNOSIS — E55.9 VITAMIN D DEFICIENCY: Primary | ICD-10-CM

## 2023-10-11 RX ORDER — CHOLECALCIFEROL (VITAMIN D3) 50 MCG
1 TABLET ORAL DAILY
Qty: 90 TABLET | Refills: 3 | Status: SHIPPED | OUTPATIENT
Start: 2023-10-11 | End: 2024-07-01

## 2023-10-11 RX ORDER — ERGOCALCIFEROL 1.25 MG/1
50000 CAPSULE, LIQUID FILLED ORAL WEEKLY
Qty: 8 CAPSULE | Refills: 0 | Status: SHIPPED | OUTPATIENT
Start: 2023-10-11 | End: 2023-11-30

## 2023-10-11 NOTE — RESULT ENCOUNTER NOTE
"Dear Terry  Your cholesterol looks good.  Your blood sugar is borderline elevated.    This is in the \"prediabetes\" range.  You are not currently diabetic, but at risk for developing this disease in the future.   Exercise, weight loss,  and limiting carbohydrates and sugars in the diet can be helpful to avoid progression to diabetes.  At minimum we need to check your blood sugar annually.    Please be seen urgently if you develop any signs or symptoms of diabetes (increase thirst or urination, fatigue, blurry vision, unexplained weight loss).    Your electrolytes, kidney function and liver function were normal.  Your thyroid function was normal.   Your vitamin D level is low.  Low vitamin D can put you at risk for low bone density and commonly increase fatigue, depression and muscle pain. I recommend supplementing with vitamin D 50,000 units vitamin D once weekly for 8 weeks then taking 2000 units daily.  I can send in a prescription for 50,000 units and you may purchase the 2000 units over the counter or I can send in a prescription.   I recommend rechecking a vitamin D level in 4 months.  You may schedule a lab only appointment for this.   Please call or MyChart message me if you have any questions.   Your blood counts and hemoglobin were normal.    I do not have an explanation for your weight loss.  We could consider and endoscopy and colonoscopy to research this further.  Let me know your thoughts.  Lou Ferraro, PAC          "

## 2024-07-01 ENCOUNTER — OFFICE VISIT (OUTPATIENT)
Dept: FAMILY MEDICINE | Facility: CLINIC | Age: 34
End: 2024-07-01
Payer: COMMERCIAL

## 2024-07-01 VITALS
OXYGEN SATURATION: 100 % | DIASTOLIC BLOOD PRESSURE: 85 MMHG | HEIGHT: 69 IN | SYSTOLIC BLOOD PRESSURE: 132 MMHG | WEIGHT: 164 LBS | TEMPERATURE: 97.4 F | RESPIRATION RATE: 16 BRPM | HEART RATE: 67 BPM | BODY MASS INDEX: 24.29 KG/M2

## 2024-07-01 DIAGNOSIS — H65.91 OME (OTITIS MEDIA WITH EFFUSION), RIGHT: ICD-10-CM

## 2024-07-01 DIAGNOSIS — H93.11 TINNITUS, RIGHT: Primary | ICD-10-CM

## 2024-07-01 DIAGNOSIS — H66.91 INFECTIVE OTITIS MEDIA, RIGHT: ICD-10-CM

## 2024-07-01 PROCEDURE — 99213 OFFICE O/P EST LOW 20 MIN: CPT

## 2024-07-01 RX ORDER — CIPROFLOXACIN AND DEXAMETHASONE 3; 1 MG/ML; MG/ML
4 SUSPENSION/ DROPS AURICULAR (OTIC) 2 TIMES DAILY
Qty: 7.5 ML | Refills: 0 | Status: SHIPPED | OUTPATIENT
Start: 2024-07-01 | End: 2024-07-08

## 2024-07-01 NOTE — PROGRESS NOTES
Assessment & Plan     Tinnitus, right  OME (otitis media with effusion), right  - no worrisome neurologic sxs today  - discussed sxs most consistent today with OME / eustachian tube dysfunction  - will refer to ENT for timely consult due to the tinnitus being so persistent and mainly unilateral  - Adult ENT  Referral  - continue supportive measures, can continue fluticasone (Flonase) in the interim w/ history of allergies  - reviewed red flag sxs and when to present to the ER / UC prn     Infective otitis externa, right  - mild to moderate otitis externa of the left ear  - will treat with ciprodex:  - ciprofloxacin-dexAMETHasone (CIPRODEX) 0.3-0.1 % otic suspension  Dispense: 7.5 mL; Refill: 0   - The uses and side effects, including black box warnings as appropriate, were discussed in detail.  All patient questions were answered.  The patient was instructed to call immediately if any side effects developed.  - advised to not insert anything into the ear including q-tips    RTC prn. The patient verbalized understanding and agreement with the plan today and has no additional questions or concerns at this time.      Rachael Stewart is a 33 year old, presenting for the following health issues:  Ear Problem (Right ear )        7/1/2024     1:05 PM   Additional Questions   Roomed by Batista   Accompanied by self         7/1/2024     1:05 PM   Patient Reported Additional Medications   Patient reports taking the following new medications No     History of Present Illness       Reason for visit:  Ringing in ear  Symptom onset:  1-2 weeks ago  Symptoms include:  Ringing in ear  Symptom intensity:  Moderate  Symptom progression:  Staying the same  Had these symptoms before:  No    He eats 0-1 servings of fruits and vegetables daily.He consumes 2 sweetened beverage(s) daily.He exercises with enough effort to increase his heart rate 30 to 60 minutes per day.  He exercises with enough effort to increase his heart rate 3  "or less days per week.   He is taking medications regularly.     Reports about 2 weeks ago developed ringing in the right ear. At times it does seem like it goes to the left for a bit and then goes back to the right. Mainly it is in the right, however. Reports the ringing is constant, but during the start of the day it is worse than later in the day. The severity waxes and wanes. Reports muffled hearing.  Denies lightheadedness. When there are a lot of noises happening it sounds like static. Does report headaches. Headaches are around the right temple area and they happen when he is around loud noises, also has some pulsing during this. The headache sensation comes and goes. It improves when not around loud noises. There is no pain any where else in the head. No vision changes. No weakness. No vomiting. No vision changes. No recent upper respiratory infections. No rhinorrhea, sinus pressure. Does have some seasonal allergies. Takes loratidine (Claritin) D which helps. A few weeks ago had some eye watering / allergies after mowing the lawn. Went to [place] urgent care and was told that he had fluid in the back of the ears. Has also tried fluticasone (Flonase) nasal spray. Does think it was helping a little bit. Symptoms are not positional. Does have some autophony. Denies numbness, weakness, tingling. No PMHx of heart disease or stroke. Carbonation makes symptoms worse.     Review of Systems  Constitutional, HEENT, cardiovascular, pulmonary, GI, , musculoskeletal, neuro, skin, endocrine and psych systems are negative, except as otherwise noted.      Objective    /85 (BP Location: Left arm, Patient Position: Sitting, Cuff Size: Adult Regular)   Pulse 67   Temp 97.4  F (36.3  C) (Temporal)   Resp 16   Ht 1.753 m (5' 9\")   Wt 74.4 kg (164 lb)   SpO2 100%   BMI 24.22 kg/m    Body mass index is 24.22 kg/m .  Physical Exam     General:  alert, oriented, pleasant and conversant. NAD. Non-toxic  HEENT:  " normocephalic, atraumatic. Sclera white, conjunctiva clear, EOMs intact. Left canal clear, TM grey, cone of light and bony landmark visualized. Right canal +diffuse erythema and tenderness (no discharge or edema). TM + mild effusion. Cone of light and bony landmarks visualized. Nares patent. OP w/ erythema and cobblestoning, no edema or lesions.  Normal dentition. No sinus tenderness. No temporal tenderness or swelling.   Neck:  supple, FROM; no thyromegaly, lymphadenopathy, or masses   Chest: chest expansion symmetrical bilaterally, lung sounds clear without wheezes, rhonchi or rales  CV: S1, S2, RRR without murmurs, rubs or gallops. No edema. No carotid bruits.   MSK: steady gait, FROM  Derm: no rashes, lesions, or ulcers. No erythema, induration or nodules  Neuro: CNII-XII grossly intact, clear and logical thought and speech. Strength of UEs 5/5 and of LEs 5/5  Psych:  cooperative, calm mood and congruent affect    Signed Electronically by: JAIDEN Estrada CNP

## 2024-09-10 ENCOUNTER — PATIENT OUTREACH (OUTPATIENT)
Dept: CARE COORDINATION | Facility: CLINIC | Age: 34
End: 2024-09-10
Payer: COMMERCIAL

## 2024-09-24 ENCOUNTER — PATIENT OUTREACH (OUTPATIENT)
Dept: CARE COORDINATION | Facility: CLINIC | Age: 34
End: 2024-09-24
Payer: COMMERCIAL

## 2024-11-15 ENCOUNTER — OFFICE VISIT (OUTPATIENT)
Dept: AUDIOLOGY | Facility: CLINIC | Age: 34
End: 2024-11-15
Payer: COMMERCIAL

## 2024-11-15 ENCOUNTER — OFFICE VISIT (OUTPATIENT)
Dept: OTOLARYNGOLOGY | Facility: CLINIC | Age: 34
End: 2024-11-15
Payer: COMMERCIAL

## 2024-11-15 DIAGNOSIS — H65.91 OME (OTITIS MEDIA WITH EFFUSION), RIGHT: ICD-10-CM

## 2024-11-15 DIAGNOSIS — H90.41 SENSORINEURAL HEARING LOSS (SNHL) OF RIGHT EAR WITH UNRESTRICTED HEARING OF LEFT EAR: Primary | ICD-10-CM

## 2024-11-15 DIAGNOSIS — H90.3 SNHL (SENSORY-NEURAL HEARING LOSS), ASYMMETRICAL: Primary | ICD-10-CM

## 2024-11-15 DIAGNOSIS — H93.11 TINNITUS, RIGHT: ICD-10-CM

## 2024-11-15 ASSESSMENT — ENCOUNTER SYMPTOMS
TINGLING: 0
RESPIRATORY NEGATIVE: 1
EYES NEGATIVE: 1
GASTROINTESTINAL NEGATIVE: 1
CONSTITUTIONAL NEGATIVE: 1
WEAKNESS: 0
DIZZINESS: 0
FALLS: 0

## 2024-11-15 NOTE — PROGRESS NOTES
AUDIOLOGY REPORT    SUMMARY: Audiology visit completed. See audiogram for results.    RECOMMENDATIONS: Follow-up with ENT.    Nohemy Arauz  Doctor of Audiology  [place] License # [id number]

## 2024-11-15 NOTE — LETTER
11/15/2024      Piedad Medina  [address]  [facility] [address]      Dear Colleague,    Thank you for referring your patient, Pidead Medina, to the [facility]. Please see a copy of my visit note below.    No chief complaint on file.     PCP: Lou Ferraro     Referring Provider: Romeo Frances    There were no vitals taken for this visit.    ENT Problem List:  Patient Active Problem List   Diagnosis     CARDIOVASCULAR SCREENING; LDL GOAL LESS THAN 160     Chronic right shoulder pain      Current Medications:  Current Outpatient Medications   Medication Sig Dispense Refill     acyclovir (ZOVIRAX) 400 MG tablet Take 1 tablet (400 mg) by mouth 3 times daily For cold sores 21 tablet 3     No current facility-administered medications for this visit.     HPI  Pleasant 34 year old male presents today as a(n) new patient for right ear tinnitus, seasonal allergies, and bilateral middle ear effusion that onset in April. He states that everything sounds far away, especially in his right ear, however when people talk it sounds very loud and close. He states that he has three kids and they were very loud at this time.  He reports constant tinnitus mostly in his right ear but it sometimes goes to his left ear. He reports that sometimes his tinnitus gets so bad it feels as if it is reverberating.   He reports that he saw his PCP and they saw fluid behind his ear drum. He was told to take Claritin and Flonase and to follow-up with ENT if his condition did not improve. He states that his fluid buildup has gotten better.  Of note, he is a   for [employer].    He denies significant balance issues, dizziness, weakness, numbness, tingling, hx of head trauma, hx of acoustic trauma, hx of head and neck surgeries, recent additional medications, issues breathing through his nose, facial pressure, otorrhea, otalgia, hx of tonsil stones, TMJ dysfunction (grinding and clenching teeth),  difficulty falling asleep.    Review of Systems   Constitutional: Negative.    HENT:  Positive for hearing loss and tinnitus. Negative for congestion, ear discharge and ear pain.    Eyes: Negative.    Respiratory: Negative.     Gastrointestinal: Negative.    Musculoskeletal:  Negative for falls.   Skin: Negative.    Neurological:  Negative for dizziness, tingling and weakness.   Endo/Heme/Allergies:  Positive for environmental allergies.       Physical Exam  Vitals and nursing note reviewed.   Constitutional:       Appearance: Normal appearance.   HENT:      Head: Normocephalic and atraumatic.      Jaw: There is normal jaw occlusion.      Right Ear: Hearing and ear canal normal. A middle ear effusion is present.      Left Ear: Hearing, tympanic membrane and ear canal normal.      Ears:      Comments: Bilateral ears were examined under the microscope.     Nose: Septal deviation (slight left) present. No mucosal edema, congestion or rhinorrhea.      Right Nostril: No occlusion.      Left Nostril: No occlusion.      Right Turbinates: Not enlarged or swollen.      Left Turbinates: Not enlarged or swollen.      Right Sinus: No maxillary sinus tenderness or frontal sinus tenderness.      Left Sinus: No maxillary sinus tenderness or frontal sinus tenderness.      Mouth/Throat:      Mouth: Mucous membranes are moist.      Pharynx: Oropharynx is clear. Uvula midline.   Eyes:      Extraocular Movements: Extraocular movements intact.      Pupils: Pupils are equal, round, and reactive to light.   Neurological:      Mental Status: He is alert.       AUDIOGRAM: The patient underwent an audiogram today.  Right: Speech reception threshold is 10 dB with 100% word recognition.  Left: Speech reception threshold is 5 dB with 100% word recognition.    A/P  Audiogram was independently reviewed and discussed in detail with the patient. This pleasant patient has SNHL, worse in the right ear, right OME, and right ear tinnitus. There are no  ear or sinus infections present.    Regarding SNHL, worse in the right ear, further investigation is needed since this is asymmetrical hearing loss. As such, an MR Brain w/o & w Contrast is ordered. He will receive a phone call to review the results of this test and future potential treatment options.     To cope with tinnitus, the following can be tried:  Get a good night's rest- OTC melatonin 1-5 MG as needed  Sound distractions such as white noise  Multivitamin containing Mg and Zn  Good hydration  Restrict caffeine, salt, MSG, fast foods, artificial flavors  Cognitive behavioral therapy as needed    Follow up in clinic prn.    Scribe/Staff:    Scribe Disclosure:   I, Cira Borja, am serving as a scribe; to document services personally performed by Tres Roque MD based on data collection and the provider's statements to me.     Provider Disclosure:  I agree with above History, Review of Systems, Physical exam and Plan.  I have reviewed the content of the documentation and have edited it as needed. I have personally performed the services documented here and the documentation accurately represents those services and the decisions I have made.      Electronically signed by:  Tres Roque MD         Again, thank you for allowing me to participate in the care of your patient.        Sincerely,        Tres Roque MD

## 2024-11-15 NOTE — PROGRESS NOTES
No chief complaint on file.     PCP: Lou Ferraro     Referring Provider: Romeo Frances    There were no vitals taken for this visit.    ENT Problem List:  Patient Active Problem List   Diagnosis    CARDIOVASCULAR SCREENING; LDL GOAL LESS THAN 160    Chronic right shoulder pain      Current Medications:  Current Outpatient Medications   Medication Sig Dispense Refill    acyclovir (ZOVIRAX) 400 MG tablet Take 1 tablet (400 mg) by mouth 3 times daily For cold sores 21 tablet 3     No current facility-administered medications for this visit.     HPI  Pleasant 34 year old male presents today as a(n) new patient for right ear tinnitus, seasonal allergies, and bilateral middle ear effusion that onset in April. He states that everything sounds far away, especially in his right ear, however when people talk it sounds very loud and close. He states that he has three kids and they were very loud at this time.  He reports constant tinnitus mostly in his right ear but it sometimes goes to his left ear. He reports that sometimes his tinnitus gets so bad it feels as if it is reverberating.   He reports that he saw his PCP and they saw fluid behind his ear drum. He was told to take Claritin and Flonase and to follow-up with ENT if his condition did not improve. He states that his fluid buildup has gotten better.  Of note, he is a   for [employer].    He denies significant balance issues, dizziness, weakness, numbness, tingling, hx of head trauma, hx of acoustic trauma, hx of head and neck surgeries, recent additional medications, issues breathing through his nose, facial pressure, otorrhea, otalgia, hx of tonsil stones, TMJ dysfunction (grinding and clenching teeth), difficulty falling asleep.    Review of Systems   Constitutional: Negative.    HENT:  Positive for hearing loss and tinnitus. Negative for congestion, ear discharge and ear pain.    Eyes: Negative.    Respiratory: Negative.     Gastrointestinal:  Negative.    Musculoskeletal:  Negative for falls.   Skin: Negative.    Neurological:  Negative for dizziness, tingling and weakness.   Endo/Heme/Allergies:  Positive for environmental allergies.       Physical Exam  Vitals and nursing note reviewed.   Constitutional:       Appearance: Normal appearance.   HENT:      Head: Normocephalic and atraumatic.      Jaw: There is normal jaw occlusion.      Right Ear: Hearing and ear canal normal. A middle ear effusion is present.      Left Ear: Hearing, tympanic membrane and ear canal normal.      Ears:      Comments: Bilateral ears were examined under the microscope.     Nose: Septal deviation (slight left) present. No mucosal edema, congestion or rhinorrhea.      Right Nostril: No occlusion.      Left Nostril: No occlusion.      Right Turbinates: Not enlarged or swollen.      Left Turbinates: Not enlarged or swollen.      Right Sinus: No maxillary sinus tenderness or frontal sinus tenderness.      Left Sinus: No maxillary sinus tenderness or frontal sinus tenderness.      Mouth/Throat:      Mouth: Mucous membranes are moist.      Pharynx: Oropharynx is clear. Uvula midline.   Eyes:      Extraocular Movements: Extraocular movements intact.      Pupils: Pupils are equal, round, and reactive to light.   Neurological:      Mental Status: He is alert.       AUDIOGRAM: The patient underwent an audiogram today.  Right: Speech reception threshold is 10 dB with 100% word recognition.  Left: Speech reception threshold is 5 dB with 100% word recognition.    A/P  Audiogram was independently reviewed and discussed in detail with the patient. This pleasant patient has SNHL, worse in the right ear, right OME, and right ear tinnitus. There are no ear or sinus infections present.    Regarding SNHL, worse in the right ear, further investigation is needed since this is asymmetrical hearing loss. As such, an MR Brain w/o & w Contrast is ordered. He will receive a phone call to review the  results of this test and future potential treatment options.     To cope with tinnitus, the following can be tried:  Get a good night's rest- OTC melatonin 1-5 MG as needed  Sound distractions such as white noise  Multivitamin containing Mg and Zn  Good hydration  Restrict caffeine, salt, MSG, fast foods, artificial flavors  Cognitive behavioral therapy as needed    Follow up in clinic prn.    Scribe/Staff:    Scribe Disclosure:   I, Cira Borja, am serving as a scribe; to document services personally performed by Tres Roque MD based on data collection and the provider's statements to me.     Provider Disclosure:  I agree with above History, Review of Systems, Physical exam and Plan.  I have reviewed the content of the documentation and have edited it as needed. I have personally performed the services documented here and the documentation accurately represents those services and the decisions I have made.      Electronically signed by:  Tres Roque MD

## 2024-11-15 NOTE — NURSING NOTE
Piedad Medina's chief complaint for this visit includes:  Chief Complaint   Patient presents with    Consult     Tinnitus and fluid in ears, onset in April, Has seasonal allergies. Was given Claritin and FLonase and some help.      PCP: Lou Ferraro    Referring Provider:  JAIDEN Ashton CNP  [address] [place] [address]    There were no vitals taken for this visit.

## 2024-11-24 ENCOUNTER — HEALTH MAINTENANCE LETTER (OUTPATIENT)
Age: 34
End: 2024-11-24

## 2024-12-30 ENCOUNTER — ANCILLARY PROCEDURE (OUTPATIENT)
Dept: MRI IMAGING | Facility: CLINIC | Age: 34
End: 2024-12-30
Attending: OTOLARYNGOLOGY
Payer: COMMERCIAL

## 2024-12-30 DIAGNOSIS — H90.3 SNHL (SENSORY-NEURAL HEARING LOSS), ASYMMETRICAL: ICD-10-CM

## 2024-12-30 DIAGNOSIS — H93.11 TINNITUS, RIGHT: ICD-10-CM

## 2024-12-30 PROCEDURE — A9585 GADOBUTROL INJECTION: HCPCS | Mod: JZ | Performed by: RADIOLOGY

## 2024-12-30 PROCEDURE — 70553 MRI BRAIN STEM W/O & W/DYE: CPT | Mod: TC | Performed by: RADIOLOGY

## 2024-12-30 RX ORDER — GADOBUTROL 604.72 MG/ML
0.1 INJECTION INTRAVENOUS ONCE
Status: COMPLETED | OUTPATIENT
Start: 2024-12-30 | End: 2024-12-30

## 2024-12-30 RX ADMIN — GADOBUTROL 7.5 ML: 604.72 INJECTION INTRAVENOUS at 18:21

## 2025-04-08 ENCOUNTER — PATIENT OUTREACH (OUTPATIENT)
Dept: CARE COORDINATION | Facility: CLINIC | Age: 35
End: 2025-04-08
Payer: COMMERCIAL